# Patient Record
Sex: MALE | Race: OTHER | HISPANIC OR LATINO
[De-identification: names, ages, dates, MRNs, and addresses within clinical notes are randomized per-mention and may not be internally consistent; named-entity substitution may affect disease eponyms.]

---

## 2018-05-31 ENCOUNTER — APPOINTMENT (OUTPATIENT)
Dept: OPHTHALMOLOGY | Facility: CLINIC | Age: 83
End: 2018-05-31
Payer: MEDICARE

## 2018-05-31 PROCEDURE — 92015 DETERMINE REFRACTIVE STATE: CPT

## 2018-05-31 PROCEDURE — 92004 COMPRE OPH EXAM NEW PT 1/>: CPT

## 2018-05-31 PROCEDURE — 92020 GONIOSCOPY: CPT

## 2019-04-24 ENCOUNTER — APPOINTMENT (OUTPATIENT)
Dept: ORTHOPEDIC SURGERY | Facility: CLINIC | Age: 84
End: 2019-04-24

## 2019-10-01 ENCOUNTER — APPOINTMENT (OUTPATIENT)
Dept: OPHTHALMOLOGY | Facility: CLINIC | Age: 84
End: 2019-10-01
Payer: MEDICARE

## 2019-10-01 ENCOUNTER — NON-APPOINTMENT (OUTPATIENT)
Age: 84
End: 2019-10-01

## 2019-10-01 PROCEDURE — 92014 COMPRE OPH EXAM EST PT 1/>: CPT

## 2019-10-01 PROCEDURE — 92132 CPTRZD OPH DX IMG ANT SGM: CPT

## 2020-11-25 ENCOUNTER — TRANSCRIPTION ENCOUNTER (OUTPATIENT)
Age: 85
End: 2020-11-25

## 2020-11-25 ENCOUNTER — INPATIENT (INPATIENT)
Facility: HOSPITAL | Age: 85
LOS: 1 days | Discharge: HOME | DRG: 340 | End: 2020-11-27
Attending: SURGERY | Admitting: SURGERY
Payer: COMMERCIAL

## 2020-11-25 VITALS
SYSTOLIC BLOOD PRESSURE: 151 MMHG | WEIGHT: 130.07 LBS | TEMPERATURE: 98 F | HEIGHT: 65 IN | RESPIRATION RATE: 16 BRPM | DIASTOLIC BLOOD PRESSURE: 81 MMHG | HEART RATE: 82 BPM | OXYGEN SATURATION: 96 %

## 2020-11-25 LAB
ALBUMIN SERPL ELPH-MCNC: 3.8 G/DL — SIGNIFICANT CHANGE UP (ref 3.3–5)
ALP SERPL-CCNC: 61 U/L — SIGNIFICANT CHANGE UP (ref 40–120)
ALT FLD-CCNC: 15 U/L — SIGNIFICANT CHANGE UP (ref 10–45)
ANION GAP SERPL CALC-SCNC: 12 MMOL/L — SIGNIFICANT CHANGE UP (ref 5–17)
APTT BLD: 28.8 SEC — SIGNIFICANT CHANGE UP (ref 27.5–35.5)
AST SERPL-CCNC: 16 U/L — SIGNIFICANT CHANGE UP (ref 10–40)
BASOPHILS # BLD AUTO: 0 K/UL — SIGNIFICANT CHANGE UP (ref 0–0.2)
BASOPHILS NFR BLD AUTO: 0 % — SIGNIFICANT CHANGE UP (ref 0–2)
BILIRUB SERPL-MCNC: 0.7 MG/DL — SIGNIFICANT CHANGE UP (ref 0.2–1.2)
BLD GP AB SCN SERPL QL: NEGATIVE — SIGNIFICANT CHANGE UP
BUN SERPL-MCNC: 18 MG/DL — SIGNIFICANT CHANGE UP (ref 7–23)
CALCIUM SERPL-MCNC: 9.4 MG/DL — SIGNIFICANT CHANGE UP (ref 8.4–10.5)
CHLORIDE SERPL-SCNC: 101 MMOL/L — SIGNIFICANT CHANGE UP (ref 96–108)
CO2 SERPL-SCNC: 23 MMOL/L — SIGNIFICANT CHANGE UP (ref 22–31)
CREAT SERPL-MCNC: 1.18 MG/DL — SIGNIFICANT CHANGE UP (ref 0.5–1.3)
EOSINOPHIL # BLD AUTO: 0 K/UL — SIGNIFICANT CHANGE UP (ref 0–0.5)
EOSINOPHIL NFR BLD AUTO: 0 % — SIGNIFICANT CHANGE UP (ref 0–6)
GIANT PLATELETS BLD QL SMEAR: PRESENT — SIGNIFICANT CHANGE UP
GLUCOSE SERPL-MCNC: 93 MG/DL — SIGNIFICANT CHANGE UP (ref 70–99)
HCT VFR BLD CALC: 40.6 % — SIGNIFICANT CHANGE UP (ref 39–50)
HGB BLD-MCNC: 13.3 G/DL — SIGNIFICANT CHANGE UP (ref 13–17)
INR BLD: 1.14 RATIO — SIGNIFICANT CHANGE UP (ref 0.88–1.16)
LIDOCAIN IGE QN: 37 U/L — SIGNIFICANT CHANGE UP (ref 7–60)
LYMPHOCYTES # BLD AUTO: 0.65 K/UL — LOW (ref 1–3.3)
LYMPHOCYTES # BLD AUTO: 5.2 % — LOW (ref 13–44)
MANUAL SMEAR VERIFICATION: SIGNIFICANT CHANGE UP
MCHC RBC-ENTMCNC: 30.9 PG — SIGNIFICANT CHANGE UP (ref 27–34)
MCHC RBC-ENTMCNC: 32.8 GM/DL — SIGNIFICANT CHANGE UP (ref 32–36)
MCV RBC AUTO: 94.2 FL — SIGNIFICANT CHANGE UP (ref 80–100)
MONOCYTES # BLD AUTO: 1.63 K/UL — HIGH (ref 0–0.9)
MONOCYTES NFR BLD AUTO: 13.1 % — SIGNIFICANT CHANGE UP (ref 2–14)
NEUTROPHILS # BLD AUTO: 9.85 K/UL — HIGH (ref 1.8–7.4)
NEUTROPHILS NFR BLD AUTO: 79.1 % — HIGH (ref 43–77)
PLAT MORPH BLD: NORMAL — SIGNIFICANT CHANGE UP
PLATELET # BLD AUTO: 194 K/UL — SIGNIFICANT CHANGE UP (ref 150–400)
POTASSIUM SERPL-MCNC: 4.6 MMOL/L — SIGNIFICANT CHANGE UP (ref 3.5–5.3)
POTASSIUM SERPL-SCNC: 4.6 MMOL/L — SIGNIFICANT CHANGE UP (ref 3.5–5.3)
PROT SERPL-MCNC: 6.6 G/DL — SIGNIFICANT CHANGE UP (ref 6–8.3)
PROTHROM AB SERPL-ACNC: 13.6 SEC — SIGNIFICANT CHANGE UP (ref 10.6–13.6)
RBC # BLD: 4.31 M/UL — SIGNIFICANT CHANGE UP (ref 4.2–5.8)
RBC # FLD: 13.2 % — SIGNIFICANT CHANGE UP (ref 10.3–14.5)
RBC BLD AUTO: SIGNIFICANT CHANGE UP
RH IG SCN BLD-IMP: POSITIVE — SIGNIFICANT CHANGE UP
SODIUM SERPL-SCNC: 136 MMOL/L — SIGNIFICANT CHANGE UP (ref 135–145)
VARIANT LYMPHS # BLD: 2.6 % — SIGNIFICANT CHANGE UP (ref 0–6)
WBC # BLD: 12.45 K/UL — HIGH (ref 3.8–10.5)
WBC # FLD AUTO: 12.45 K/UL — HIGH (ref 3.8–10.5)

## 2020-11-25 PROCEDURE — 99285 EMERGENCY DEPT VISIT HI MDM: CPT

## 2020-11-25 RX ORDER — SODIUM CHLORIDE 9 MG/ML
1000 INJECTION, SOLUTION INTRAVENOUS
Refills: 0 | Status: DISCONTINUED | OUTPATIENT
Start: 2020-11-25 | End: 2020-11-26

## 2020-11-25 RX ORDER — TAMSULOSIN HYDROCHLORIDE 0.4 MG/1
0.4 CAPSULE ORAL AT BEDTIME
Refills: 0 | Status: DISCONTINUED | OUTPATIENT
Start: 2020-11-25 | End: 2020-11-26

## 2020-11-25 RX ORDER — PIPERACILLIN AND TAZOBACTAM 4; .5 G/20ML; G/20ML
3.38 INJECTION, POWDER, LYOPHILIZED, FOR SOLUTION INTRAVENOUS ONCE
Refills: 0 | Status: COMPLETED | OUTPATIENT
Start: 2020-11-25 | End: 2020-11-25

## 2020-11-25 RX ORDER — ENOXAPARIN SODIUM 100 MG/ML
40 INJECTION SUBCUTANEOUS DAILY
Refills: 0 | Status: DISCONTINUED | OUTPATIENT
Start: 2020-11-25 | End: 2020-11-26

## 2020-11-25 RX ORDER — MEMANTINE HYDROCHLORIDE 10 MG/1
10 TABLET ORAL DAILY
Refills: 0 | Status: DISCONTINUED | OUTPATIENT
Start: 2020-11-25 | End: 2020-11-26

## 2020-11-25 RX ORDER — BUPROPION HYDROCHLORIDE 150 MG/1
300 TABLET, EXTENDED RELEASE ORAL DAILY
Refills: 0 | Status: DISCONTINUED | OUTPATIENT
Start: 2020-11-25 | End: 2020-11-26

## 2020-11-25 RX ADMIN — PIPERACILLIN AND TAZOBACTAM 200 GRAM(S): 4; .5 INJECTION, POWDER, LYOPHILIZED, FOR SOLUTION INTRAVENOUS at 21:48

## 2020-11-25 NOTE — ED ADULT NURSE NOTE - NSIMPLEMENTINTERV_GEN_ALL_ED
Implemented All Fall Risk Interventions:  [place] to call system. Call bell, personal items and telephone within reach. Instruct patient to call for assistance. Room bathroom lighting operational. Non-slip footwear when patient is off stretcher. Physically safe environment: no spills, clutter or unnecessary equipment. Stretcher in lowest position, wheels locked, appropriate side rails in place. Provide visual cue, wrist band, yellow gown, etc. Monitor gait and stability. Monitor for mental status changes and reorient to person, place, and time. Review medications for side effects contributing to fall risk. Reinforce activity limits and safety measures with patient and family.
None

## 2020-11-25 NOTE — H&P ADULT - HISTORY OF PRESENT ILLNESS
84 year old male with PMhx of dementia, HLD, HTN, hx of prostate cancer July 2019, depression presented by his daughter to his inability to discuss his pain states he has RLQ abdominal pain x 1 day. His daughter states she only realized last night that he was guarding and holding his right lower abdomen and groaning. His daughter states they went to their primary care doctor this morning in which a CT scan was done and showed an inflammed appendix and was told to go to the ED. His daughter states she has realized he has lost 5lbs in the last 4 months while keeping the same diet even eating more recently. His daughter states that he has not eaten anything today. His daughter states his last colonscopy was when he was 61 with no significant finding and has not done one since. Patient denies nausea, vomiting, changes in bowel movement, hematochezia, indigestion, dysphagia, fever, chills, any new lumps, skin changes.

## 2020-11-25 NOTE — ED PROVIDER NOTE - CLINICAL SUMMARY MEDICAL DECISION MAKING FREE TEXT BOX
84M presenting with CC of RLQ abd with outpatient dx of appendicitis PE: VSS, RLQ ttp Ddx: Concern for underlying malignancy given age, will assess pre op labs. Plan: Labs, surg consult

## 2020-11-25 NOTE — ED PROVIDER NOTE - RAPID ASSESSMENT
84 year old M with presents to ED c/o abd pain. Per daughter pt had outpt CAT scan that showed appendicitis, showing "large fluid thick walled appendix ". Pt has imaging disc with him. Deferring pain meds at this time.  PMD Dr. Fred Gomez.     Earl Brewer (MD) note: The scribe's (Enedelia Garcia) documentation has been prepared under my direction and personally reviewed by me.  Patient was seen as a tele QDOC patient. The patient will be seen and further worked up in the main emergency department and their care will be completed by the main emergency department team along with a thorough physical exam. Receiving team will follow up on labs, analgesia, any clinical imaging, reassess and disposition as clinically indicated, all decisions regarding the progression of care will be made at their discretion.   Scribe Statement: IJose, attest that this documentation has been prepared under the direction and in the presence of Doctor Earl Brewer (MD) 84 year old M with presents to ED c/o abd pain. Per daughter pt had outpt CAT scan that showed appendicitis, showing "large fluid thick walled appendix ". Pt has imaging disc with him. Deferring pain meds at this time.  PMD Dr. Fred Gomez.       Patient was seen as a tele QDOC patient. The patient will be seen and further worked up in the main emergency department and their care will be completed by the main emergency department team along with a thorough physical exam. Receiving team will follow up on labs, analgesia, any clinical imaging, reassess and disposition as clinically indicated, all decisions regarding the progression of care will be made at their discretion.   Scribe Statement: I, Jose Mcdaniels, attest that this documentation has been prepared under the direction and in the presence of Doctor Earl Brewer (MD)    Earl Brewer (MD) note: The scribe's (Enedelia Garcia) documentation has been prepared under my direction and personally reviewed by me.

## 2020-11-25 NOTE — ED ADULT NURSE NOTE - OBJECTIVE STATEMENT
Patient is a 84 year old male complaining of abdominal pain. Patient has history of dementia, htn. Patient is A&O x 2, pt at baseline as per daughter. as per Pt daughter, pt had abdominal pain x 2 days and went to his pcp and had ct scan that showed appendicitis. pt states pain has improved,  Denies complaints of chest pain, sob, fevers, chills, n/v/d, headache, syncope, burning urination, blood in urine, blood in stool. Skin is warm and dry. Color is consistent with ethnicity. Safety and comfort maintained. Will continue to monitor. Patient is a 84 year old male complaining of abdominal pain. Patient has history of dementia, htn. Patient is A&O x 2, pt at baseline as per daughter. as per Pt daughter, pt had abdominal pain x 2 days and went to his pcp and had ct scan that showed appendicitis. pt states pain has improved. Denies complaints of chest pain, sob, fevers, chills, n/v/d, headache, syncope, burning urination, blood in urine, blood in stool. Skin is warm and dry. Color is consistent with ethnicity. Safety and comfort maintained. daughter at bedside. Will continue to monitor.

## 2020-11-25 NOTE — H&P ADULT - NSHPPHYSICALEXAM_GEN_ALL_CORE
Physical Examination:  GEN: NAD, resting quietly  NEURO: AAOx3, CN II-XII grossly intact, no focal deficits  PULM: symmetric chest rise bilaterally, no increased WOB  ABD: soft, RLQ tenderness with guarding and no rebound tenderness   EXTR: no cyanosis or edema, moving all extremities

## 2020-11-25 NOTE — H&P ADULT - NSICDXPASTMEDICALHX_GEN_ALL_CORE_FT
PAST MEDICAL HISTORY:  Dementia     Depression     HLD (hyperlipidemia)     HTN (hypertension)     Personal history of prostate cancer

## 2020-11-25 NOTE — ED PROVIDER NOTE - ATTENDING CONTRIBUTION TO CARE
Attending MD Aj: I personally have seen and examined this patient.  Resident note reviewed and agree on plan of care and except where noted.  See below for details.     Seen in [place] [facility], accompanied by daughter, interviewed in Greek    [facility] Radiology 11/25/20 date of service "Impression: Enlarged, fluid filled and thick walled appendix, infiltration of the para appendiceal fat, thickening of the right lower quadrant peritoneal lining, mucosal thickening of the cecal wall at the appendiceal insertion.  Findings consistent with acute appendicitis.  Mottled air noted at the appendiceal base, which may be suggestive of appendiceal rupture."    84M with PMH/PSH including dementia (AAOx2 knows name and place, not time), HTN, HLD, prostate cancer, depression presents to the ED with outpatient CT showing appendicitis, ?rupture.  Patient denies all physical complaints.  Daughter reports that noted patient was holding lower abdomen and groaning.  Reports took patient to PMD who ordered CT scan, findings as above.  Daughter reports decreased po intake with associated weight loss over the last few months.  Denies recent colonoscopy, reports had a distant one.  Denies noting blood in stools, increased work of breathing, cough, reports of chest pain, vomiting, rash.  Limited ROS from daughter, unable to obtain given patient's dementia.    Exam:   General: NAD, AAOx2  HENT: head NCAT, airway patent with moist mucous membranes  Eyes: PERRL  Lungs: lungs CTAB with good inspiratory effort, no wheezing, no rhonchi, no rales  Cardiac: +S1S2, no m/r/g  GI: abdomen soft with +BS, +McBurney's point tenderness, no rebound, +voluntary guarding, ND  : no CVAT  MSK: FROM at neck, no tenderness to midline palpation, no stepoffs along length of spine, no calf tenderness, swelling, erythema or warmth  Neuro: moving all extremities with 5/5 strength bilateral upper and lower extremities, sensory grossly intact, no gross neuro deficits  Skin: no rash    A/P: 84M with outpatient perforated appendicitis, will send preop labs, upload CT, consult sx, IV abx, keep npo, likely admit

## 2020-11-25 NOTE — ED PROVIDER NOTE - PHYSICAL EXAMINATION
Const: Well-nourished, Well-developed, appearing stated age.  Eyes: no conjunctival injection, and symmetrical lids.  HEENT: Head NCAT, no lesions. Atraumatic external nose and ears. Moist MM.  Neck: Symmetric, trachea midline.   CVS: +S1/S2, Peripheral pulses 2+ and equal in all extremities.  RESP: Unlabored respiratory effort. Clear to auscultation bilaterally.  GI: +RLQ tenderness to palpation w/out guarding, Nondistended, No CVA tenderness b/l.   MSK: Normocephalic/Atraumatic, Lower Extremities w/o calf tenderness or edema b/l.   Skin: Warm, dry and intact.   Neuro: CNs II-XII grossly intact. Motor & Sensation grossly intact.  Psych: Awake, Alert, & Oriented (AAO) x2, to self and location. Appropriate mood and affect.

## 2020-11-25 NOTE — H&P ADULT - NSHPLABSRESULTS_GEN_ALL_CORE
----------  LABORATORY DATA:  ----------                        13.3   12.45 )-----------( 194      ( 25 Nov 2020 19:54 )             40.6               11-25    136  |  101  |  18  ----------------------------<  93  4.6   |  23  |  1.18    Ca    9.4      25 Nov 2020 19:54    TPro  6.6  /  Alb  3.8  /  TBili  0.7  /  DBili  x   /  AST  16  /  ALT  15  /  AlkPhos  61  11-25    LIVER FUNCTIONS - ( 25 Nov 2020 19:54 )  Alb: 3.8 g/dL / Pro: 6.6 g/dL / ALK PHOS: 61 U/L / ALT: 15 U/L / AST: 16 U/L / GGT: x           PT/INR - ( 25 Nov 2020 19:54 )   PT: 13.6 sec;   INR: 1.14 ratio         PTT - ( 25 Nov 2020 19:54 )  PTT:28.8 sec              ----------  RADIOGRAPHIC DATA:  --------- ----------  LABORATORY DATA:  ----------                        13.3   12.45 )-----------( 194      ( 25 Nov 2020 19:54 )             40.6               11-25    136  |  101  |  18  ----------------------------<  93  4.6   |  23  |  1.18    Ca    9.4      25 Nov 2020 19:54    TPro  6.6  /  Alb  3.8  /  TBili  0.7  /  DBili  x   /  AST  16  /  ALT  15  /  AlkPhos  61  11-25    LIVER FUNCTIONS - ( 25 Nov 2020 19:54 )  Alb: 3.8 g/dL / Pro: 6.6 g/dL / ALK PHOS: 61 U/L / ALT: 15 U/L / AST: 16 U/L / GGT: x           PT/INR - ( 25 Nov 2020 19:54 )   PT: 13.6 sec;   INR: 1.14 ratio         PTT - ( 25 Nov 2020 19:54 )  PTT:28.8 sec              ----------  RADIOGRAPHIC DATA:  ---------  OSH CT  thickened and dilated appendix with significant para-appendiceal inflammation, cecal thickening, and infiltration into the pertioneum with thickening in the RLQ

## 2020-11-25 NOTE — ED PROVIDER NOTE - OBJECTIVE STATEMENT
84M w pmhx of HTN, HLD, BPH presenting with CC of abd pain w associated nausea and vomiting x 1 day. Most of history obtained from daughter who is at bedside as patient has hx of dementia. +diarrhea, no f/c/vomiting, chest pain. No known history of abd surgeries, no AC. Patient went to PCP office and was sent for emergent CT, found to have appendicitis, sent to ER.

## 2020-11-25 NOTE — H&P ADULT - NSHPREVIEWOFSYSTEMS_GEN_ALL_CORE
Patient denies nausea, vomiting, bowel movement changes, changes in urination, fever, chills, dysphagia.

## 2020-11-25 NOTE — H&P ADULT - ASSESSMENT
Mr. Mckenna is an 84 year old male with PMhx of dementia, HLD, HTN, hx of prostate cancer who presents from PCP with appendicitis. The patient will be added on for laparoscopic appendectomy pending evaluation by Dr. Dickerson in AM. He may need additional workup due to patient age and possibility for malignancy.    Plan:  - admit to Dr. Dickerson  - NPO/IVF  - may need 1:1 for confusion/dementia  - continue IV abx  - added on and consented for procedure  - lovenox for dvt ppx  - continue home medications except statin/losartan    Discussed with Dr. Dickerson  Red Surgery  [id number]

## 2020-11-26 ENCOUNTER — RESULT REVIEW (OUTPATIENT)
Age: 85
End: 2020-11-26

## 2020-11-26 DIAGNOSIS — K37 UNSPECIFIED APPENDICITIS: ICD-10-CM

## 2020-11-26 LAB
ANION GAP SERPL CALC-SCNC: 13 MMOL/L — SIGNIFICANT CHANGE UP (ref 5–17)
BUN SERPL-MCNC: 17 MG/DL — SIGNIFICANT CHANGE UP (ref 7–23)
CALCIUM SERPL-MCNC: 9.1 MG/DL — SIGNIFICANT CHANGE UP (ref 8.4–10.5)
CHLORIDE SERPL-SCNC: 100 MMOL/L — SIGNIFICANT CHANGE UP (ref 96–108)
CO2 SERPL-SCNC: 24 MMOL/L — SIGNIFICANT CHANGE UP (ref 22–31)
CREAT SERPL-MCNC: 1.18 MG/DL — SIGNIFICANT CHANGE UP (ref 0.5–1.3)
GLUCOSE SERPL-MCNC: 98 MG/DL — SIGNIFICANT CHANGE UP (ref 70–99)
HCT VFR BLD CALC: 36.1 % — LOW (ref 39–50)
HGB BLD-MCNC: 12.4 G/DL — LOW (ref 13–17)
MAGNESIUM SERPL-MCNC: 2 MG/DL — SIGNIFICANT CHANGE UP (ref 1.6–2.6)
MCHC RBC-ENTMCNC: 31.8 PG — SIGNIFICANT CHANGE UP (ref 27–34)
MCHC RBC-ENTMCNC: 34.3 GM/DL — SIGNIFICANT CHANGE UP (ref 32–36)
MCV RBC AUTO: 92.6 FL — SIGNIFICANT CHANGE UP (ref 80–100)
NRBC # BLD: 0 /100 WBCS — SIGNIFICANT CHANGE UP (ref 0–0)
PHOSPHATE SERPL-MCNC: 3.2 MG/DL — SIGNIFICANT CHANGE UP (ref 2.5–4.5)
PLATELET # BLD AUTO: 188 K/UL — SIGNIFICANT CHANGE UP (ref 150–400)
POTASSIUM SERPL-MCNC: 4.2 MMOL/L — SIGNIFICANT CHANGE UP (ref 3.5–5.3)
POTASSIUM SERPL-SCNC: 4.2 MMOL/L — SIGNIFICANT CHANGE UP (ref 3.5–5.3)
RBC # BLD: 3.9 M/UL — LOW (ref 4.2–5.8)
RBC # FLD: 13.2 % — SIGNIFICANT CHANGE UP (ref 10.3–14.5)
SARS-COV-2 RNA SPEC QL NAA+PROBE: SIGNIFICANT CHANGE UP
SODIUM SERPL-SCNC: 137 MMOL/L — SIGNIFICANT CHANGE UP (ref 135–145)
WBC # BLD: 11.4 K/UL — HIGH (ref 3.8–10.5)
WBC # FLD AUTO: 11.4 K/UL — HIGH (ref 3.8–10.5)

## 2020-11-26 PROCEDURE — 88304 TISSUE EXAM BY PATHOLOGIST: CPT | Mod: 26

## 2020-11-26 RX ORDER — MEMANTINE HYDROCHLORIDE 10 MG/1
1 TABLET ORAL
Qty: 0 | Refills: 0 | DISCHARGE

## 2020-11-26 RX ORDER — LOSARTAN POTASSIUM 100 MG/1
1 TABLET, FILM COATED ORAL
Qty: 0 | Refills: 0 | DISCHARGE

## 2020-11-26 RX ORDER — PIPERACILLIN AND TAZOBACTAM 4; .5 G/20ML; G/20ML
3.38 INJECTION, POWDER, LYOPHILIZED, FOR SOLUTION INTRAVENOUS EVERY 8 HOURS
Refills: 0 | Status: DISCONTINUED | OUTPATIENT
Start: 2020-11-26 | End: 2020-11-26

## 2020-11-26 RX ORDER — PIPERACILLIN AND TAZOBACTAM 4; .5 G/20ML; G/20ML
3.38 INJECTION, POWDER, LYOPHILIZED, FOR SOLUTION INTRAVENOUS EVERY 8 HOURS
Refills: 0 | Status: DISCONTINUED | OUTPATIENT
Start: 2020-11-26 | End: 2020-11-27

## 2020-11-26 RX ORDER — HYDROMORPHONE HYDROCHLORIDE 2 MG/ML
0.5 INJECTION INTRAMUSCULAR; INTRAVENOUS; SUBCUTANEOUS
Refills: 0 | Status: DISCONTINUED | OUTPATIENT
Start: 2020-11-26 | End: 2020-11-26

## 2020-11-26 RX ORDER — ENOXAPARIN SODIUM 100 MG/ML
40 INJECTION SUBCUTANEOUS DAILY
Refills: 0 | Status: DISCONTINUED | OUTPATIENT
Start: 2020-11-26 | End: 2020-11-27

## 2020-11-26 RX ORDER — LOSARTAN POTASSIUM 100 MG/1
50 TABLET, FILM COATED ORAL DAILY
Refills: 0 | Status: DISCONTINUED | OUTPATIENT
Start: 2020-11-26 | End: 2020-11-27

## 2020-11-26 RX ORDER — ONDANSETRON 8 MG/1
4 TABLET, FILM COATED ORAL ONCE
Refills: 0 | Status: DISCONTINUED | OUTPATIENT
Start: 2020-11-26 | End: 2020-11-26

## 2020-11-26 RX ORDER — SIMVASTATIN 20 MG/1
1 TABLET, FILM COATED ORAL
Qty: 0 | Refills: 0 | DISCHARGE

## 2020-11-26 RX ORDER — TAMSULOSIN HYDROCHLORIDE 0.4 MG/1
1 CAPSULE ORAL
Qty: 0 | Refills: 0 | DISCHARGE

## 2020-11-26 RX ORDER — BUPROPION HYDROCHLORIDE 150 MG/1
1 TABLET, EXTENDED RELEASE ORAL
Qty: 0 | Refills: 0 | DISCHARGE

## 2020-11-26 RX ORDER — SIMVASTATIN 20 MG/1
40 TABLET, FILM COATED ORAL AT BEDTIME
Refills: 0 | Status: DISCONTINUED | OUTPATIENT
Start: 2020-11-26 | End: 2020-11-27

## 2020-11-26 RX ORDER — ACETAMINOPHEN 500 MG
650 TABLET ORAL EVERY 6 HOURS
Refills: 0 | Status: DISCONTINUED | OUTPATIENT
Start: 2020-11-26 | End: 2020-11-27

## 2020-11-26 RX ORDER — BUPROPION HYDROCHLORIDE 150 MG/1
300 TABLET, EXTENDED RELEASE ORAL DAILY
Refills: 0 | Status: DISCONTINUED | OUTPATIENT
Start: 2020-11-26 | End: 2020-11-27

## 2020-11-26 RX ORDER — HYDROMORPHONE HYDROCHLORIDE 2 MG/ML
0.25 INJECTION INTRAMUSCULAR; INTRAVENOUS; SUBCUTANEOUS
Refills: 0 | Status: DISCONTINUED | OUTPATIENT
Start: 2020-11-26 | End: 2020-11-26

## 2020-11-26 RX ORDER — IBUPROFEN 200 MG
400 TABLET ORAL EVERY 6 HOURS
Refills: 0 | Status: DISCONTINUED | OUTPATIENT
Start: 2020-11-26 | End: 2020-11-27

## 2020-11-26 RX ORDER — OXYCODONE HYDROCHLORIDE 5 MG/1
5 TABLET ORAL EVERY 6 HOURS
Refills: 0 | Status: DISCONTINUED | OUTPATIENT
Start: 2020-11-26 | End: 2020-11-27

## 2020-11-26 RX ORDER — ACETAMINOPHEN 500 MG
1000 TABLET ORAL ONCE
Refills: 0 | Status: DISCONTINUED | OUTPATIENT
Start: 2020-11-26 | End: 2020-11-26

## 2020-11-26 RX ORDER — TAMSULOSIN HYDROCHLORIDE 0.4 MG/1
0.4 CAPSULE ORAL AT BEDTIME
Refills: 0 | Status: DISCONTINUED | OUTPATIENT
Start: 2020-11-26 | End: 2020-11-27

## 2020-11-26 RX ADMIN — MEMANTINE HYDROCHLORIDE 10 MILLIGRAM(S): 10 TABLET ORAL at 00:51

## 2020-11-26 RX ADMIN — Medication 400 MILLIGRAM(S): at 21:58

## 2020-11-26 RX ADMIN — Medication 400 MILLIGRAM(S): at 21:28

## 2020-11-26 RX ADMIN — SODIUM CHLORIDE 100 MILLILITER(S): 9 INJECTION, SOLUTION INTRAVENOUS at 01:54

## 2020-11-26 RX ADMIN — ENOXAPARIN SODIUM 40 MILLIGRAM(S): 100 INJECTION SUBCUTANEOUS at 12:07

## 2020-11-26 RX ADMIN — TAMSULOSIN HYDROCHLORIDE 0.4 MILLIGRAM(S): 0.4 CAPSULE ORAL at 00:52

## 2020-11-26 RX ADMIN — PIPERACILLIN AND TAZOBACTAM 25 GRAM(S): 4; .5 INJECTION, POWDER, LYOPHILIZED, FOR SOLUTION INTRAVENOUS at 05:42

## 2020-11-26 RX ADMIN — PIPERACILLIN AND TAZOBACTAM 25 GRAM(S): 4; .5 INJECTION, POWDER, LYOPHILIZED, FOR SOLUTION INTRAVENOUS at 21:28

## 2020-11-26 RX ADMIN — MEMANTINE HYDROCHLORIDE 10 MILLIGRAM(S): 10 TABLET ORAL at 12:07

## 2020-11-26 RX ADMIN — TAMSULOSIN HYDROCHLORIDE 0.4 MILLIGRAM(S): 0.4 CAPSULE ORAL at 21:29

## 2020-11-26 RX ADMIN — PIPERACILLIN AND TAZOBACTAM 25 GRAM(S): 4; .5 INJECTION, POWDER, LYOPHILIZED, FOR SOLUTION INTRAVENOUS at 13:15

## 2020-11-26 RX ADMIN — BUPROPION HYDROCHLORIDE 300 MILLIGRAM(S): 150 TABLET, EXTENDED RELEASE ORAL at 12:07

## 2020-11-26 RX ADMIN — SIMVASTATIN 40 MILLIGRAM(S): 20 TABLET, FILM COATED ORAL at 21:28

## 2020-11-26 NOTE — PROGRESS NOTE ADULT - ASSESSMENT
Mr. Mckenna is an 84 year old male with PMhx of dementia, HLD, HTN, hx of prostate cancer who presents from PCP with appendicitis. The patient will be added on for laparoscopic appendectomy pending evaluation by Dr. Dickerson in AM. He may need additional workup due to patient age and possibility for malignancy.    Plan:  - NPO/IVF  - 1:1 for confusion/dementia; s/p self IV line removal  - continue IV abx Zosyn   - added on and consented for procedure  - lovenox for dvt ppx  - continue home medications except statin/losartan    Red Surgery  [id number] Mr. Mckenna is an 84 year old male with PMhx of dementia, HLD, HTN, hx of prostate cancer who presents from PCP with appendicitis. The patient will be added on for laparoscopic appendectomy pending evaluation by Dr. Dickerson in AM. He may need additional workup due to patient age and possibility for malignancy.    Plan:  - NPO/IVF  - 1:1 dc'd this morning   - continue IV abx Zosyn   - added on and consented for procedure  - lovenox for dvt ppx  - continue home medications except statin/losartan    Red Surgery  [id number]

## 2020-11-26 NOTE — CHART NOTE - NSCHARTNOTEFT_GEN_A_CORE
Post Operative Note  Patient: CUONG PAIGE 84y (1935) Male   MRN: [id number]  Location: [address] D1  Visit: 11-26-20 Inpatient  Date: 11-26-20 @ 19:47    Procedure: S/P lap appendectomy    Subjective: Patient is  Denies N/V, fevers, chills.       Objective:  Vitals: T(F): 98.1 (11-26-20 @ 18:59), Max: 99.2 (11-25-20 @ 21:13)  HR: 95 (11-26-20 @ 18:59)  BP: 108/67 (11-26-20 @ 18:59) (93/55 - 127/73)  RR: 17 (11-26-20 @ 18:59)  SpO2: 94% (11-26-20 @ 18:59)  Vent Settings:     In:   11-25-20 @ 07:01  -  11-26-20 @ 07:00  --------------------------------------------------------  IN: 800 mL    11-26-20 @ 07:01  -  11-26-20 @ 19:47  --------------------------------------------------------  IN: 0 mL      IV Fluids:     Out:   11-25-20 @ 07:01  -  11-26-20 @ 07:00  --------------------------------------------------------  OUT: 300 mL    11-26-20 @ 07:01  -  11-26-20 @ 19:47  --------------------------------------------------------  OUT: 300 mL      EBL:     Voided Urine:   11-25-20 @ 07:01  -  11-26-20 @ 07:00  --------------------------------------------------------  OUT: 300 mL    11-26-20 @ 07:01  -  11-26-20 @ 19:47  --------------------------------------------------------  OUT: 300 mL      Eid Catheter: no           Physical Examination:  General: NAD, resting comfortably in bed  HEENT: Normocephalic atraumatic  Respiratory: Nonlabored respirations, normal CW expansion.  Cardio: S1S2, regular rate and rhythm.  Abdomen: softly distended, appropriately tender, surgical incisions are c/d/i.   Vascular: extremities are warm and well perfused.     Imaging:  No post-op imaging studies    Assessment:  84yMale patient S/P lap appy    Plan:  - IV Abx: zosyn  - Pain control PRN  - Diet: Reg  - Activity: as tolerated  - DVT ppx: lovenox    Red surgery 9002    Date/Time: 11-26-20 @ 19:47 Post Operative Note  Patient: CUONG PAIGE 84y (1935) Male   MRN: [id number]  Location: [facility] 2MON 211 D1  Visit: 11-26-20 Inpatient  Date: 11-26-20 @ 19:47    Procedure: S/P lap appendectomy    Subjective: Patient reports pain well controlled. Has not passed gas or had BM.   Denies N/V, fevers, chills, SOB.      Objective:  Vitals: T(F): 98.1 (11-26-20 @ 18:59), Max: 99.2 (11-25-20 @ 21:13)  HR: 95 (11-26-20 @ 18:59)  BP: 108/67 (11-26-20 @ 18:59) (93/55 - 127/73)  RR: 17 (11-26-20 @ 18:59)  SpO2: 94% (11-26-20 @ 18:59)  Vent Settings:     In:   11-25-20 @ 07:01  -  11-26-20 @ 07:00  --------------------------------------------------------  IN: 800 mL    11-26-20 @ 07:01  -  11-26-20 @ 19:47  --------------------------------------------------------  IN: 0 mL      IV Fluids:     Out:   11-25-20 @ 07:01  -  11-26-20 @ 07:00  --------------------------------------------------------  OUT: 300 mL    11-26-20 @ 07:01  -  11-26-20 @ 19:47  --------------------------------------------------------  OUT: 300 mL      EBL:     Voided Urine:   11-25-20 @ 07:01  -  11-26-20 @ 07:00  --------------------------------------------------------  OUT: 300 mL    11-26-20 @ 07:01  -  11-26-20 @ 19:47  --------------------------------------------------------  OUT: 300 mL      Eid Catheter: no           Physical Examination:  General: NAD, resting comfortably in bed  HEENT: Normocephalic atraumatic  Respiratory: Nonlabored respirations, normal CW expansion.  Cardio: S1S2, regular rate and rhythm.  Abdomen: softly distended, appropriately tender, surgical incisions are c/d/i.   Vascular: extremities are warm and well perfused.     Imaging:  No post-op imaging studies    Assessment:  84yMale patient S/P lap appendectomy, recovering well.    Plan:  - IV Abx: zosyn  - Pain control PRN  - Diet: Reg  - Activity: as tolerated  - DVT ppx: lovenox    Red surgery 9002    Date/Time: 11-26-20 @ 19:47 Post Operative Note  Patient: CUONG PAIGE 84y (1935) Male   MRN: [id number]  Location: [facility] 2MON 211 D1  Visit: 11-26-20 Inpatient  Date: 11-26-20 @ 19:47    Procedure: S/P lap appendectomy    Subjective: Patient reports pain well controlled. Has not passed gas or had BM.   Denies N/V, fevers, chills, SOB.      Objective:  Vitals: T(F): 98.1 (11-26-20 @ 18:59), Max: 99.2 (11-25-20 @ 21:13)  HR: 95 (11-26-20 @ 18:59)  BP: 108/67 (11-26-20 @ 18:59) (93/55 - 127/73)  RR: 17 (11-26-20 @ 18:59)  SpO2: 94% (11-26-20 @ 18:59)  Vent Settings:     In:   11-25-20 @ 07:01  -  11-26-20 @ 07:00  --------------------------------------------------------  IN: 800 mL    11-26-20 @ 07:01  -  11-26-20 @ 19:47  --------------------------------------------------------  IN: 0 mL      IV Fluids:     Out:   11-25-20 @ 07:01  -  11-26-20 @ 07:00  --------------------------------------------------------  OUT: 300 mL    11-26-20 @ 07:01  -  11-26-20 @ 19:47  --------------------------------------------------------  OUT: 300 mL      EBL:     Voided Urine:   11-25-20 @ 07:01  -  11-26-20 @ 07:00  --------------------------------------------------------  OUT: 300 mL    11-26-20 @ 07:01  -  11-26-20 @ 19:47  --------------------------------------------------------  OUT: 300 mL      Eid Catheter: no           Physical Examination:  General: NAD, resting comfortably in bed  HEENT: Normocephalic atraumatic  Respiratory: Nonlabored respirations, normal CW expansion.  Cardio: S1S2, regular rate and rhythm.  Abdomen: softly distended, appropriately tender, surgical incisions are c/d/i.   Vascular: extremities are warm and well perfused.     Imaging:  No post-op imaging studies    Assessment:  84yMale patient several hours S/P lap appendectomy, recovering well.    Plan:  - IV Abx: zosyn  - Pain control PRN  - Diet: Reg  - Activity: as tolerated  - DVT ppx: lovenox    Red surgery 9002    Date/Time: 11-26-20 @ 19:47

## 2020-11-26 NOTE — PROGRESS NOTE ADULT - SUBJECTIVE AND OBJECTIVE BOX
GENERAL SURGERY PROGRESS NOTE   ___________________________________________________________________    CUONG PAIGE | [id number] | 84y Male | [facility] 2MON 211 W1 | LOS     Attending: Bert Dickerson    ___________________________________________________________________      SUBJECTIVE:       Diet, NPO:   Except Medications (11-26-20 @ 00:47) [Active]      PMH:   Personal history of prostate cancer    Depression    Dementia    HLD (hyperlipidemia)    HTN (hypertension)          OBJECTIVE:    Allegies:  NKDA    MEDICATIONS  (STANDING):  buPROPion XL . 300 milliGRAM(s) Oral daily  enoxaparin Injectable 40 milliGRAM(s) SubCutaneous daily  lactated ringers. 1000 milliLiter(s) (100 mL/Hr) IV Continuous <Continuous>  memantine 10 milliGRAM(s) Oral daily  piperacillin/tazobactam IVPB.. 3.375 Gram(s) IV Intermittent every 8 hours  tamsulosin 0.4 milliGRAM(s) Oral at bedtime    MEDICATIONS  (PRN):  acetaminophen  IVPB .. 1000 milliGRAM(s) IV Intermittent once PRN Mild Pain (1 - 3)      Vitals:  Height (cm): 165.1  Weight (kg): 59  BMI (kg/m2): 21.6  T(C): 37 (11-26-20 @ 00:54), Max: 37.3 (11-25-20 @ 21:13)  HR: 82 (11-26-20 @ 00:54) (80 - 82)  BP: 127/73 (11-26-20 @ 00:54) (118/69 - 151/81)  RR: 18 (11-26-20 @ 00:54) (16 - 18)  SpO2: 98% (11-26-20 @ 00:54) (96% - 100%)        Intake&Output:  Totals:    Outputs:    Urine Output:      Laboratory:                        13.3   12.45 )-----------( 194      ( 25 Nov 2020 19:54 )             40.6               11-25    136  |  101  |  18  ----------------------------<  93  4.6   |  23  |  1.18    Ca    9.4      25 Nov 2020 19:54    TPro  6.6  /  Alb  3.8  /  TBili  0.7  /  DBili  x   /  AST  16  /  ALT  15  /  AlkPhos  61  11-25    LIVER FUNCTIONS - ( 25 Nov 2020 19:54 )  Alb: 3.8 g/dL / Pro: 6.6 g/dL / ALK PHOS: 61 U/L / ALT: 15 U/L / AST: 16 U/L / GGT: x           PT/INR - ( 25 Nov 2020 19:54 )   PT: 13.6 sec;   INR: 1.14 ratio         PTT - ( 25 Nov 2020 19:54 )  PTT:28.8 sec    COVID-19 PCR: NotDetec (25 Nov 2020 21:10)  ,   ,   CAPILLARY BLOOD GLUCOSE            Recent Imaging:      Reviewed laboratory and imaging   GENERAL SURGERY PROGRESS NOTE   ___________________________________________________________________    CUONG PAIGE | [id number] | 84y Male | [facility] 2[facility] 211 W1 | LOS     Attending: Bert Dickerson    ___________________________________________________________________      SUBJECTIVE: 1:1 ordered yesterday because pt pulled IV out. Asked by floor to DC 1:1 because pt is calm and they wrapped up IV. Pt seen and examined at bedside. Patient comfortable. No nausea, vomiting, diarrhea. Pain is controlled. NPO for OR      Diet, NPO:   Except Medications (11-26-20 @ 00:47) [Active]      PMH:   Personal history of prostate cancer    Depression    Dementia    HLD (hyperlipidemia)    HTN (hypertension)          OBJECTIVE:    Allegies:  NKDA    MEDICATIONS  (STANDING):  buPROPion XL . 300 milliGRAM(s) Oral daily  enoxaparin Injectable 40 milliGRAM(s) SubCutaneous daily  lactated ringers. 1000 milliLiter(s) (100 mL/Hr) IV Continuous <Continuous>  memantine 10 milliGRAM(s) Oral daily  piperacillin/tazobactam IVPB.. 3.375 Gram(s) IV Intermittent every 8 hours  tamsulosin 0.4 milliGRAM(s) Oral at bedtime    MEDICATIONS  (PRN):  acetaminophen  IVPB .. 1000 milliGRAM(s) IV Intermittent once PRN Mild Pain (1 - 3)      Vitals:  Height (cm): 165.1  Weight (kg): 59  BMI (kg/m2): 21.6  T(C): 37 (11-26-20 @ 00:54), Max: 37.3 (11-25-20 @ 21:13)  HR: 82 (11-26-20 @ 00:54) (80 - 82)  BP: 127/73 (11-26-20 @ 00:54) (118/69 - 151/81)  RR: 18 (11-26-20 @ 00:54) (16 - 18)  SpO2: 98% (11-26-20 @ 00:54) (96% - 100%)        Intake&Output:  Totals:    Outputs:    Urine Output:      Laboratory:                        13.3   12.45 )-----------( 194      ( 25 Nov 2020 19:54 )             40.6               11-25    136  |  101  |  18  ----------------------------<  93  4.6   |  23  |  1.18    Ca    9.4      25 Nov 2020 19:54    TPro  6.6  /  Alb  3.8  /  TBili  0.7  /  DBili  x   /  AST  16  /  ALT  15  /  AlkPhos  61  11-25    LIVER FUNCTIONS - ( 25 Nov 2020 19:54 )  Alb: 3.8 g/dL / Pro: 6.6 g/dL / ALK PHOS: 61 U/L / ALT: 15 U/L / AST: 16 U/L / GGT: x           PT/INR - ( 25 Nov 2020 19:54 )   PT: 13.6 sec;   INR: 1.14 ratio         PTT - ( 25 Nov 2020 19:54 )  PTT:28.8 sec    COVID-19 PCR: NotDetec (25 Nov 2020 21:10)  ,   ,   CAPILLARY BLOOD GLUCOSE            Recent Imaging:      Reviewed laboratory and imaging

## 2020-11-26 NOTE — BRIEF OPERATIVE NOTE - OPERATION/FINDINGS
Laparoscopic appendectomy: perforated appendix with walled off small abscess (drained). hemostasis achieved.

## 2020-11-27 ENCOUNTER — TRANSCRIPTION ENCOUNTER (OUTPATIENT)
Age: 85
End: 2020-11-27

## 2020-11-27 VITALS
TEMPERATURE: 98 F | HEART RATE: 85 BPM | DIASTOLIC BLOOD PRESSURE: 77 MMHG | SYSTOLIC BLOOD PRESSURE: 125 MMHG | OXYGEN SATURATION: 97 % | RESPIRATION RATE: 18 BRPM

## 2020-11-27 LAB
ANION GAP SERPL CALC-SCNC: 11 MMOL/L — SIGNIFICANT CHANGE UP (ref 5–17)
ANION GAP SERPL CALC-SCNC: 14 MMOL/L — SIGNIFICANT CHANGE UP (ref 5–17)
BUN SERPL-MCNC: 19 MG/DL — SIGNIFICANT CHANGE UP (ref 7–23)
BUN SERPL-MCNC: 19 MG/DL — SIGNIFICANT CHANGE UP (ref 7–23)
CALCIUM SERPL-MCNC: 8.7 MG/DL — SIGNIFICANT CHANGE UP (ref 8.4–10.5)
CALCIUM SERPL-MCNC: 9 MG/DL — SIGNIFICANT CHANGE UP (ref 8.4–10.5)
CHLORIDE SERPL-SCNC: 100 MMOL/L — SIGNIFICANT CHANGE UP (ref 96–108)
CHLORIDE SERPL-SCNC: 103 MMOL/L — SIGNIFICANT CHANGE UP (ref 96–108)
CO2 SERPL-SCNC: 22 MMOL/L — SIGNIFICANT CHANGE UP (ref 22–31)
CO2 SERPL-SCNC: 25 MMOL/L — SIGNIFICANT CHANGE UP (ref 22–31)
CREAT SERPL-MCNC: 1.24 MG/DL — SIGNIFICANT CHANGE UP (ref 0.5–1.3)
CREAT SERPL-MCNC: 1.31 MG/DL — HIGH (ref 0.5–1.3)
GLUCOSE SERPL-MCNC: 114 MG/DL — HIGH (ref 70–99)
GLUCOSE SERPL-MCNC: 99 MG/DL — SIGNIFICANT CHANGE UP (ref 70–99)
HCT VFR BLD CALC: 33.7 % — LOW (ref 39–50)
HGB BLD-MCNC: 11.5 G/DL — LOW (ref 13–17)
MAGNESIUM SERPL-MCNC: 2.2 MG/DL — SIGNIFICANT CHANGE UP (ref 1.6–2.6)
MCHC RBC-ENTMCNC: 31.4 PG — SIGNIFICANT CHANGE UP (ref 27–34)
MCHC RBC-ENTMCNC: 34.1 GM/DL — SIGNIFICANT CHANGE UP (ref 32–36)
MCV RBC AUTO: 92.1 FL — SIGNIFICANT CHANGE UP (ref 80–100)
NRBC # BLD: 0 /100 WBCS — SIGNIFICANT CHANGE UP (ref 0–0)
PHOSPHATE SERPL-MCNC: 4.2 MG/DL — SIGNIFICANT CHANGE UP (ref 2.5–4.5)
PLATELET # BLD AUTO: 188 K/UL — SIGNIFICANT CHANGE UP (ref 150–400)
POTASSIUM SERPL-MCNC: 3.8 MMOL/L — SIGNIFICANT CHANGE UP (ref 3.5–5.3)
POTASSIUM SERPL-MCNC: 4.3 MMOL/L — SIGNIFICANT CHANGE UP (ref 3.5–5.3)
POTASSIUM SERPL-SCNC: 3.8 MMOL/L — SIGNIFICANT CHANGE UP (ref 3.5–5.3)
POTASSIUM SERPL-SCNC: 4.3 MMOL/L — SIGNIFICANT CHANGE UP (ref 3.5–5.3)
RBC # BLD: 3.66 M/UL — LOW (ref 4.2–5.8)
RBC # FLD: 13.2 % — SIGNIFICANT CHANGE UP (ref 10.3–14.5)
SARS-COV-2 IGG SERPL QL IA: NEGATIVE — SIGNIFICANT CHANGE UP
SARS-COV-2 IGM SERPL IA-ACNC: <0.1 INDEX — SIGNIFICANT CHANGE UP
SODIUM SERPL-SCNC: 136 MMOL/L — SIGNIFICANT CHANGE UP (ref 135–145)
SODIUM SERPL-SCNC: 139 MMOL/L — SIGNIFICANT CHANGE UP (ref 135–145)
WBC # BLD: 10.87 K/UL — HIGH (ref 3.8–10.5)
WBC # FLD AUTO: 10.87 K/UL — HIGH (ref 3.8–10.5)

## 2020-11-27 RX ORDER — LANOLIN ALCOHOL/MO/W.PET/CERES
1 CREAM (GRAM) TOPICAL
Qty: 0 | Refills: 0 | DISCHARGE
Start: 2020-11-27

## 2020-11-27 RX ORDER — ACETAMINOPHEN 500 MG
2 TABLET ORAL
Qty: 0 | Refills: 0 | DISCHARGE
Start: 2020-11-27

## 2020-11-27 RX ORDER — OXYCODONE HYDROCHLORIDE 5 MG/1
1 TABLET ORAL
Qty: 15 | Refills: 0
Start: 2020-11-27 | End: 2020-12-03

## 2020-11-27 RX ORDER — IBUPROFEN 200 MG
1 TABLET ORAL
Qty: 0 | Refills: 0 | DISCHARGE
Start: 2020-11-27

## 2020-11-27 RX ORDER — LANOLIN ALCOHOL/MO/W.PET/CERES
3 CREAM (GRAM) TOPICAL AT BEDTIME
Refills: 0 | Status: DISCONTINUED | OUTPATIENT
Start: 2020-11-27 | End: 2020-11-27

## 2020-11-27 RX ORDER — SODIUM CHLORIDE 9 MG/ML
1000 INJECTION, SOLUTION INTRAVENOUS ONCE
Refills: 0 | Status: COMPLETED | OUTPATIENT
Start: 2020-11-27 | End: 2020-11-27

## 2020-11-27 RX ADMIN — ENOXAPARIN SODIUM 40 MILLIGRAM(S): 100 INJECTION SUBCUTANEOUS at 11:39

## 2020-11-27 RX ADMIN — PIPERACILLIN AND TAZOBACTAM 25 GRAM(S): 4; .5 INJECTION, POWDER, LYOPHILIZED, FOR SOLUTION INTRAVENOUS at 14:08

## 2020-11-27 RX ADMIN — SODIUM CHLORIDE 1000 MILLILITER(S): 9 INJECTION, SOLUTION INTRAVENOUS at 12:30

## 2020-11-27 RX ADMIN — BUPROPION HYDROCHLORIDE 300 MILLIGRAM(S): 150 TABLET, EXTENDED RELEASE ORAL at 11:39

## 2020-11-27 RX ADMIN — LOSARTAN POTASSIUM 50 MILLIGRAM(S): 100 TABLET, FILM COATED ORAL at 05:02

## 2020-11-27 RX ADMIN — Medication 400 MILLIGRAM(S): at 05:32

## 2020-11-27 RX ADMIN — Medication 400 MILLIGRAM(S): at 05:02

## 2020-11-27 RX ADMIN — PIPERACILLIN AND TAZOBACTAM 25 GRAM(S): 4; .5 INJECTION, POWDER, LYOPHILIZED, FOR SOLUTION INTRAVENOUS at 05:02

## 2020-11-27 NOTE — DISCHARGE NOTE NURSING/CASE MANAGEMENT/SOCIAL WORK - PATIENT PORTAL LINK FT
You can access the FollowMyHealth Patient Portal offered by [facility] by registering at the following website: [email or web address]. By joining [employer]’s FollowMyHealth portal, you will also be able to view your health information using other applications (apps) compatible with our system.

## 2020-11-27 NOTE — PROGRESS NOTE ADULT - ASSESSMENT
84yMale patient several hours S/P lap appendectomy, recovering well.    Plan:  - IV Abx: zosyn  - Pain control PRN  - Diet: Reg  - Activity: as tolerated  - DVT ppx: lovenox  - likely dc home today    Red surgery [id number]     84yMale patient several hours S/P lap appendectomy for perforated appendicitis, recovering well.    Plan:  - IV Abx: zosyn  - f/u labs  - Pain control PRN  - Diet: Reg  - Activity: as tolerated  - DVT ppx: lovenox  - likely dc home today with po abx    Red surgery 1230

## 2020-11-27 NOTE — DISCHARGE NOTE PROVIDER - NSDCFUADDAPPT_GEN_ALL_CORE_FT
Your PMD--Please call for a follow up appointment in the next 1-2 weeks regarding your recent surgery and hospitalization.

## 2020-11-27 NOTE — PHYSICAL THERAPY INITIAL EVALUATION ADULT - PERTINENT HX OF CURRENT PROBLEM, REHAB EVAL
85 y/o male with PMhx of dementia, HLD, HTN, hx of prostate cancer who presents from PCP with appendicitis. The patient will be added on for laparoscopic appendectomy pending evaluation by Dr. Dickerson in AM. He may need additional workup due to patient age and possibility for malignancy. 85 y/o male with PMhx of dementia, HLD, HTN, hx of prostate cancer who presents from PCP with appendicitis.  s/p lap appy, may need workup due to patient age and possibility for malignancy.

## 2020-11-27 NOTE — PHYSICAL THERAPY INITIAL EVALUATION ADULT - CRITERIA FOR SKILLED THERAPEUTIC INTERVENTIONS
home no skilled PT needs but needs supervision for amb and ADL;s due to dementia/anticipated equipment needs at discharge

## 2020-11-27 NOTE — PHYSICAL THERAPY INITIAL EVALUATION ADULT - DID THE PATIENT HAVE SURGERY?
n/a Laparoscopic appendectomy: perforated appendix with walled off small abscess (drained). hemostasis achieved./yes

## 2020-11-27 NOTE — DISCHARGE NOTE PROVIDER - NSDCMRMEDTOKEN_GEN_ALL_CORE_FT
acetaminophen 325 mg oral tablet: 2 tab(s) orally every 6 hours, As needed, Mild Pain (1 - 3)  buPROPion 300 mg/24 hours (XL) oral tablet, extended release: 1 tab(s) orally every 24 hours  ibuprofen 400 mg oral tablet: 1 tab(s) orally every 6 hours  losartan 50 mg oral tablet: 1 tab(s) orally once a day  melatonin 3 mg oral tablet: 1 tab(s) orally once a day (at bedtime)  memantine 10 mg oral tablet: 1 tab(s) orally once a day  oxyCODONE 5 mg oral tablet: 1-2  tab(s) orally every 4-6 hours, As Needed MDD:6  simvastatin 40 mg oral tablet: 1 tab(s) orally once a day (at bedtime)  tamsulosin 0.4 mg oral capsule: 1 cap(s) orally once a day   acetaminophen 325 mg oral tablet: 2 tab(s) orally every 6 hours, As needed, Mild Pain (1 - 3)  Augmentin 875 mg-125 mg oral tablet: 1 tab(s) orally every 12 hours  buPROPion 300 mg/24 hours (XL) oral tablet, extended release: 1 tab(s) orally every 24 hours  ibuprofen 400 mg oral tablet: 1 tab(s) orally every 6 hours  losartan 50 mg oral tablet: 1 tab(s) orally once a day  melatonin 3 mg oral tablet: 1 tab(s) orally once a day (at bedtime)  memantine 10 mg oral tablet: 1 tab(s) orally once a day  oxyCODONE 5 mg oral tablet: 1-2  tab(s) orally every 4-6 hours, As Needed MDD:6  simvastatin 40 mg oral tablet: 1 tab(s) orally once a day (at bedtime)  tamsulosin 0.4 mg oral capsule: 1 cap(s) orally once a day

## 2020-11-27 NOTE — PHYSICAL THERAPY INITIAL EVALUATION ADULT - ADDITIONAL COMMENTS
lives w/ lives w/ wife and children in private house, 2-3 steps to enter w/ handrail/ flight of stairs to bedroom, does not use assistive device, glasses all the time, hearing good, Left handed

## 2020-11-27 NOTE — PROGRESS NOTE ADULT - SUBJECTIVE AND OBJECTIVE BOX
Surgery Progress Note    SUBJECTIVE: Pt seen and examined at bedside. Patient comfortable and in no-apparent distress. Has not eaten yet since surgery. Pain is controlled, no nausea or vomiting.    Vital Signs Last 24 Hrs  T(C): 36.7 (27 Nov 2020 04:27), Max: 37.1 (26 Nov 2020 10:27)  T(F): 98.1 (27 Nov 2020 04:27), Max: 98.7 (26 Nov 2020 10:27)  HR: 77 (27 Nov 2020 04:27) (68 - 95)  BP: 105/61 (27 Nov 2020 04:27) (93/55 - 124/78)  BP(mean): 80 (26 Nov 2020 17:15) (70 - 83)  RR: 18 (27 Nov 2020 04:27) (15 - 18)  SpO2: 96% (27 Nov 2020 04:27) (94% - 100%)    Physical Exam:  General Appearance: Appears well, NAD  Respiratory: No labored breathing  CV: Pulse regularly present  Abdomen: Soft, nontender, port incisions c/d/i    LABS:                        12.4   11.40 )-----------( 188      ( 26 Nov 2020 07:18 )             36.1     11-26    137  |  100  |  17  ----------------------------<  98  4.2   |  24  |  1.18    Ca    9.1      26 Nov 2020 07:18  Phos  3.2     11-26  Mg     2.0     11-26    TPro  6.6  /  Alb  3.8  /  TBili  0.7  /  DBili  x   /  AST  16  /  ALT  15  /  AlkPhos  61  11-25    PT/INR - ( 25 Nov 2020 19:54 )   PT: 13.6 sec;   INR: 1.14 ratio         PTT - ( 25 Nov 2020 19:54 )  PTT:28.8 sec      INs and OUTs:    11-26-20 @ 07:01  -  11-27-20 @ 07:00  --------------------------------------------------------  IN: 680 mL / OUT: 450 mL / NET: 230 mL

## 2020-11-27 NOTE — DISCHARGE NOTE PROVIDER - NSDCCPTREATMENT_GEN_ALL_CORE_FT
PRINCIPAL PROCEDURE  Procedure: Lap appendectomy  Findings and Treatment: Laparoscopic appendectomy: perforated appendix with walled off small abscess (drained). hemostasis achieved.

## 2020-11-27 NOTE — DISCHARGE NOTE PROVIDER - NSDCCPCAREPLAN_GEN_ALL_CORE_FT
PRINCIPAL DISCHARGE DIAGNOSIS  Diagnosis: Appendicitis  Assessment and Plan of Treatment: Activity- No heavy lifting or straining over 15 lbs for the next two weeks;  Driving- Please do not drive until your pain is well controlled and you do not need to take pain medications.  You may shower-Do not submerge or scrub incision sites.  Please pat dry incisions/dressings.  Leave the white steri strips in place, they will fall off on their own in approximately 5-7 days.

## 2020-11-27 NOTE — DISCHARGE NOTE PROVIDER - CARE PROVIDER_API CALL
Bert Dickerson)  Surgery  [address] [facility] [address], [address]  [place] [address]  Phone: [phone]  Fax: [phone]  Follow Up Time: 2 weeks

## 2020-11-27 NOTE — DISCHARGE NOTE PROVIDER - HOSPITAL COURSE
Mr. Mckenna is an 84 year old male with PMhx of dementia, HLD, HTN, hx of prostate cancer who presents from PCP with appendicitis.  Outside CT: thickened and dilated appendix with significant para-appendiceal inflammation, cecal thickening, and infiltration into the peritoneum with thickening in the RLQ.   Patient was admitted, placed on IV ABX / NPO and underwent a Laparoscopic appendectomy. The patient tolerated the procedure well, there were no complications. The patient was extubated in the OR, transferred to the PACU in stable condition and then transferred to a surgical floor. Diet was advanced as tolerated. He was seen by physical therapy which recommended discharge to _____. The patient's pain was controlled and they were placed back on their home medications.  At the time of discharge, the patient was hemodynamically stable, tolerating PO diet, voiding urine, ambulating and was comfortable with adequate pain control. The patient was instructed to follow up with Dr. Dickerson within 1-2 weeks after discharge. The patient felt comfortable with discharge. The patient was discharged to ____.  The patient had no other issues. Mr. Mckenna is an 84 year old male with PMhx of dementia, HLD, HTN, hx of prostate cancer who presents from PCP with appendicitis.  Outside CT: thickened and dilated appendix with significant para-appendiceal inflammation, cecal thickening, and infiltration into the peritoneum with thickening in the RLQ.   Patient was admitted, placed on IV ABX / NPO and underwent a Laparoscopic appendectomy. The patient tolerated the procedure well, there were no complications. The patient was extubated in the OR, transferred to the PACU in stable condition and then transferred to a surgical floor. Diet was advanced as tolerated. He was seen by physical therapy which recommended discharge to home/ no needs. The patient's pain was controlled and they were placed back on their home medications.  At the time of discharge, the patient was hemodynamically stable, tolerating PO diet, voiding urine, ambulating and was comfortable with adequate pain control. The patient was instructed to follow up with Dr. Dickerson within 1-2 weeks after discharge. The patient felt comfortable with discharge. The patient was discharged home w PO Abx course x 1 week.  The patient had no other issues.

## 2020-12-01 LAB — SURGICAL PATHOLOGY STUDY: SIGNIFICANT CHANGE UP

## 2020-12-28 PROCEDURE — 97161 PT EVAL LOW COMPLEX 20 MIN: CPT

## 2020-12-28 PROCEDURE — C9399: CPT

## 2020-12-28 PROCEDURE — 80053 COMPREHEN METABOLIC PANEL: CPT

## 2020-12-28 PROCEDURE — U0003: CPT

## 2020-12-28 PROCEDURE — 86900 BLOOD TYPING SEROLOGIC ABO: CPT

## 2020-12-28 PROCEDURE — 88304 TISSUE EXAM BY PATHOLOGIST: CPT

## 2020-12-28 PROCEDURE — 85025 COMPLETE CBC W/AUTO DIFF WBC: CPT

## 2020-12-28 PROCEDURE — 86850 RBC ANTIBODY SCREEN: CPT

## 2020-12-28 PROCEDURE — 86901 BLOOD TYPING SEROLOGIC RH(D): CPT

## 2020-12-28 PROCEDURE — C1889: CPT

## 2020-12-28 PROCEDURE — 96374 THER/PROPH/DIAG INJ IV PUSH: CPT

## 2020-12-28 PROCEDURE — 99285 EMERGENCY DEPT VISIT HI MDM: CPT | Mod: 25

## 2020-12-28 PROCEDURE — 86769 SARS-COV-2 COVID-19 ANTIBODY: CPT

## 2020-12-28 PROCEDURE — 85730 THROMBOPLASTIN TIME PARTIAL: CPT

## 2020-12-28 PROCEDURE — 84100 ASSAY OF PHOSPHORUS: CPT

## 2020-12-28 PROCEDURE — 85027 COMPLETE CBC AUTOMATED: CPT

## 2020-12-28 PROCEDURE — 85610 PROTHROMBIN TIME: CPT

## 2020-12-28 PROCEDURE — 83690 ASSAY OF LIPASE: CPT

## 2020-12-28 PROCEDURE — 83735 ASSAY OF MAGNESIUM: CPT

## 2020-12-28 PROCEDURE — 80048 BASIC METABOLIC PNL TOTAL CA: CPT

## 2021-08-24 ENCOUNTER — APPOINTMENT (OUTPATIENT)
Age: 86
End: 2021-08-24

## 2022-05-10 NOTE — H&P ADULT - REASON FOR ADMISSION
Physical Therapy  . [x] daily progress note       [] discharge       Patient Name:  Sebastian Heredia   :  1936 MRN: [id number]  Room:  [address] [place] [address] Date of Admission: 2022  Rehabilitation Diagnosis:   Diverticulitis of intestine, part unspecified, without perforation or abscess without bleeding [K57.92]  Diverticulitis [K57.92]       Date 5/10/2022       Day of ARU Week:  2   Time IN/OUT 1315/1415   Individual Tx Minutes 60   Group Tx Minutes    Co-Treat Minutes    Concurrent Tx Minutes    TOTAL Tx Time Mins 60   Variance Time    Variance Time []   Refusal due to:     []   Medical hold/reason:    []   Illness   []   Off Unit for test/procedure  []   Extra time needed to complete task  []   Therapeutic need  []   Other (specify):   Restrictions Restrictions/Precautions  Restrictions/Precautions: Fall Risk,Contact Precautions (C-diff)      Interdisciplinary communication [x]   Cleared for therapy per nursing     []   RN notified about issues during session  []   RN updated on pt performance  []   Spoke with   []   Spoke with OT  []   Spoke with MD  []   Other:    Subjective observations and cognitive status: Pt in recliner, agreeable to therapy. Pain level/location:   3 /10       Location: L hip after ex   Discharge recommendations  Anticipated discharge date:    Destination: []home alone   [x]home alone with assist PRN     [] home w/ family      [] Continuous supervision  []SNF    [] Assisted living     [] Other:  Continued therapy: [x]HHC PT  []OUTPATIENT  PT   [] No Further PT  []SNF PT  Caregiver training recommended: []Yes  [x] No   Equipment needs: pt has 2ww and cane     Bed Mobility:           [x]   Pt received out of bed       Transfers:    Sit--> Stand: Mod I  Stand --> Sit:   Mod I  Chair-->Bed/Bed --> Chair: supervision with cues for walker   Block practice with chair to chair transfers with visual and verbal demonstration for correct technique vs her technique.  Pt improved in Abdominal Pain safety after block training, but will continue to assess carryover. Gait:    Distance:  350', 200' , 150'  Assistance:  Distal supervision. With no distractions, pt able to ambulate mod I. When distractions or obstacles introduced that require problem solving pt needs supervision for verbal cues  Device:  2ww  Introduced cane and ambulated in room 120' with 4 turns with pt demonstrating decreased trunk rotation and arm swing and mild path deviation, SBA        Additional Therapeutic activities/exercises completed this date:     []   Nu-step:  Time:        Level:         #Steps:       []   Rebounder:    []  Seated     []  Standing        []   Balance training         []   Postural training    []   Supine ther ex (reps/sets):     []   Seated ther ex (reps/sets):     [x]   Standing ther ex (reps/sets): Pt required 50% cues for technique for standing HEP of mini squats, heel/toe lifts, abduction, knee flexion, marching 10 x1.      []   Picking up object from floor (standing):                   []   Reacher used   []   Other:   []   Other:    Comments:      Patient/Caregiver Education and Training:   []   Role of PT  []   Education about Dx  [x]   Use of call light for assist   []   Wheelchair mobility/management  [x]   HEP provided and explained   [x]   Treatment plan reviewed  []   Home safety  []   Body mechanics  [x]   Positioning  [x]   Bed Mobility/Transfer technique  [x]   Gait technique/sequencing  [x]   Proper use of assistive device/adaptive equipment  []   Stair training/Advanced mobility safety and technique  []   Reinforced patient's precautions/mobility while maintaining precautions  []   Postural awareness  []   Family/caregiver training  []   Progress was updated and reviewed in Rehabtracker with patient and/or family this date. []   Other:      Treatment Plan for Next Session: QI     Assessment:    Assessment:   This pt demonstrated a positive   response to today's treatment as evidenced by progression to trial with cane for gait, improved transfer carryover for sit to stand. The patient is making progress toward established goals as evidenced by QI scores. Treatment/Activity Tolerance:   [] Tolerated treatment with no adverse effects    [] Patient limited by fatigue  [x] Patient limited by pain   [] Patient limited by medical complications:    [] Adverse reaction to Tx:   [] Significant change in status    Barrier/s to progress/learning:   []   None  []   Cognition  []   Hearing deficit  []   Pre-morbid mental/psychological status   []   Motivation  []   Communication  [x]   Anxiety  []   Vision deficit  []   Attention  []   Other:      Safety:       []  bed alarm set    [x]  chair alarm set    []  Pt refused alarms                []  Telesitter activated      [x]  Gait belt used during tx session      []other:         Number of Minutes/Billable Intervention  Gait Training 30   Therapeutic Exercise 10   Neuro Re-Ed    Therapeutic Activity 20   Wheelchair Propulsion    Group    Other:    TOTAL 60         Social History  Social/Functional History  Lives With: Alone  Type of Home: House  Home Layout: One level (Pt has basement but does not use. Has stair lift but has never been used.)  Home Access: Stairs to enter with rails  Entrance Stairs - Number of Steps: 3-4 EV  Entrance Stairs - Rails: Both  Bathroom Shower/Tub: Tub/Shower unit,Shower chair with back  Bathroom Toilet: Handicap height (Pt states frame over toilet c grab bars.)  Bathroom Equipment: Grab bars in shower,Shower chair,Grab bars around toilet,Hand-held shower  Bathroom Accessibility: Accessible  Home Equipment: Cane,Walker, rolling,Grab bars,Reacher  Has the patient had two or more falls in the past year or any fall with injury in the past year?: No  Receives Help From: Family (Luisa Abreu lives 4 doors down.  He assists c grocery shopping and house cleaning.)  ADL Assistance: Independent  Homemaking Assistance: Needs assistance (chika does cleaning and grocery shopping)  Homemaking Responsibilities: Yes  Meal Prep Responsibility: Primary  Laundry Responsibility: Primary  Cleaning Responsibility: Primary  Bill Paying/Finance Responsibility: No (Chika took over ~ 2 months ago as Pt states she began \"having a hard time remembering\")  Shopping Responsibility: Primary  Health Care Management: Secondary (Pt and grandson complete together.)  Ambulation Assistance: Independent (Pt reports she would use a cane at night and would take it in public just incase. Pt primarily Ind without device.)  Transfer Assistance: Independent  Active : Yes  Mode of Transportation: Car  Occupation: Retired  Type of Occupation:   Leisure & Hobbies: Pt enjoys watching television, cooking, and being active. Additional Comments: Pt has regular, flat bed at home; no falls in the past year    Objective                                                                                    Goals:  (Update in navigator)   : Long Term Goals  Time Frame for Long term goals : 10-12 tx days:  Long term goal 1: Pt will complete sup<->sit Ind. Long term goal 2: Pt will complete OOB transfers using 2WW Mod Ind. Long term goal 3: Pt will ambulate 10 ft and 50 ft with turns using 2WW Mod Ind. Long term goal 4: Pt will ambulate 150 ft over level surface and at least 10 ft of uneven surface using 2WW with SBA. Long term goal 5: Pt will ascend/descend curb step using 2WW and 1 flight of stairs using railings with SBA-Sup. Additional Goals?: Yes  Long term goal 6: Pt will complete object retrieval from the floor with 2WW and reacher Mod Ind. Long term goal 7: Pt will propel manual w/c 150 ft with turns Mod Ind.:        Plan of Care                                                                              Times per week: 5 days per week for a minimum of 60 minutes/day plus group as appropriate for 60 minutes.   Treatment to include Current Treatment Recommendations: Strengthening,Balance training,Functional mobility training,Transfer training,Endurance training,Gait training,Stair training,Home exercise program,Safety education & training,Patient/Caregiver education & training,Equipment evaluation, education, & procurement,Therapeutic activities,Wheelchair mobility training    Electronically signed by   Diana Garcia PT,  5/10/2022, 1:27 PM

## 2022-07-22 ENCOUNTER — NON-APPOINTMENT (OUTPATIENT)
Age: 87
End: 2022-07-22

## 2022-07-24 ENCOUNTER — TRANSCRIPTION ENCOUNTER (OUTPATIENT)
Age: 87
End: 2022-07-24

## 2022-07-25 ENCOUNTER — TRANSCRIPTION ENCOUNTER (OUTPATIENT)
Age: 87
End: 2022-07-25

## 2022-07-25 PROBLEM — F03.90 UNSPECIFIED DEMENTIA, UNSPECIFIED SEVERITY, WITHOUT BEHAVIORAL DISTURBANCE, PSYCHOTIC DISTURBANCE, MOOD DISTURBANCE, AND ANXIETY: Chronic | Status: ACTIVE | Noted: 2020-11-25

## 2022-07-25 PROBLEM — I10 ESSENTIAL (PRIMARY) HYPERTENSION: Chronic | Status: ACTIVE | Noted: 2020-11-25

## 2022-07-25 PROBLEM — Z85.46 PERSONAL HISTORY OF MALIGNANT NEOPLASM OF PROSTATE: Chronic | Status: ACTIVE | Noted: 2020-11-25

## 2022-07-25 PROBLEM — F32.9 MAJOR DEPRESSIVE DISORDER, SINGLE EPISODE, UNSPECIFIED: Chronic | Status: ACTIVE | Noted: 2020-11-25

## 2022-07-25 PROBLEM — E78.5 HYPERLIPIDEMIA, UNSPECIFIED: Chronic | Status: ACTIVE | Noted: 2020-11-25

## 2022-07-28 ENCOUNTER — APPOINTMENT (OUTPATIENT)
Dept: DISASTER EMERGENCY | Facility: HOSPITAL | Age: 87
End: 2022-07-28

## 2022-07-28 ENCOUNTER — TRANSCRIPTION ENCOUNTER (OUTPATIENT)
Age: 87
End: 2022-07-28

## 2022-07-28 ENCOUNTER — OUTPATIENT (OUTPATIENT)
Dept: OUTPATIENT SERVICES | Facility: HOSPITAL | Age: 87
LOS: 1 days | End: 2022-07-28

## 2022-07-28 VITALS
SYSTOLIC BLOOD PRESSURE: 135 MMHG | DIASTOLIC BLOOD PRESSURE: 77 MMHG | HEART RATE: 70 BPM | OXYGEN SATURATION: 97 % | WEIGHT: 136.03 LBS | TEMPERATURE: 98 F | RESPIRATION RATE: 17 BRPM | HEIGHT: 63 IN

## 2022-07-28 VITALS
HEART RATE: 67 BPM | TEMPERATURE: 99 F | RESPIRATION RATE: 17 BRPM | SYSTOLIC BLOOD PRESSURE: 135 MMHG | DIASTOLIC BLOOD PRESSURE: 81 MMHG

## 2022-07-28 DIAGNOSIS — U07.1 COVID-19: ICD-10-CM

## 2022-07-28 RX ORDER — BEBTELOVIMAB 87.5 MG/ML
175 INJECTION, SOLUTION INTRAVENOUS ONCE
Refills: 0 | Status: COMPLETED | OUTPATIENT
Start: 2022-07-28 | End: 2022-07-28

## 2022-07-28 RX ADMIN — BEBTELOVIMAB 175 MILLIGRAM(S): 87.5 INJECTION, SOLUTION INTRAVENOUS at 11:52

## 2022-07-28 NOTE — CHART NOTE - NSCHARTNOTEFT_GEN_A_CORE
CC: Monoclonal Antibody Infusion/COVID 19 Positive  86y Male with PMHx of Dementia, HTn, Prostate CA 2019 s/p RT, HLD and recent dx of COVID 19+ who presents today for elective Bebtelovimab. Patient has been screened and was deemed to be a candidate.    Symptoms/ Criteria  Date of Symptom Onset: 7/23/22  Symptoms: sore throat, cough, fever, body ache   Date of Positive COVID PCR: 7/24/22  Risk Profile includes:       Vaccination Status: Moderna x3    PMHx:  Infection due to severe acute respiratory syndrome coronavirus 2 (SARS-CoV-2)    HTN (hypertension)    HLD (hyperlipidemia)    Dementia    Depression    Personal history of prostate cancer        Exam/findings:  T(C): --  HR: --  BP: --  RR: --  SpO2: --    PE:   Appearance: NAD	  HEENT:  NC/AT  Cardiovascular:  No edema  Respiratory: no use of accessory muscles  Gastrointestinal:  non-distended   Skin: warm and dry  Neurologic: Non-focal  Extremities: Normal range of motion    ASSESSMENT:  Pt is COVID positive with mild to moderate symptoms who was referred for elective MAB (Bebtelovimab).    PLAN:  - MAB treatment explained to patient. I have reviewed the Bebtelovimab Emergency Use Authorization (EUA) and I have provided the patient or patient's caregiver with the following information:   1. FDA has authorized emergency use of Bebtelovimab to be administered for the treatment of mild to moderate COVID-19, which is not an FDA-approved biological product.   2. The patient or patient's caregiver has the option to accept or refuse administration of MAB.   3. The significant known and potential risks and benefits of Bebtelovimab and the extent to which such risks and benefits are unknown.  4. Information on available alternative treatments and risks and benefits of those alternatives.  - Patient verbalized understanding of plan and agrees to treatment. All questions answered.  - Consent for MAB obtained.   - 175mg of Bebtelovimab administered as a single intravenous injection over at least 30 seconds.   - Observe patient for one hour post medication administration and then if stable, discharge home with outpatient follow up as planned by PCP.      POST ASSESSMENT:   Patient completed MAB, and monitored x 1 hour post-infusion with no adverse reactions noted, remained hemodynamically stable.  - Patient tolerated infusion well; denies complaints of chest pain/SOB/dizziness/palpitations.   - VSS for discharge home.  - D/C instructions given/ fact sheet included.  - Patient was instructed to self-isolate and use infection control measures (e.g wear mask, isolate, social distance, avoid sharing personal items, clean and disinfect "high touch" surfaces, and frequent handwashing according to the CDC guidelines.   - The patient was informed on what symptoms to be aware of for the next couple of days, and if there are any issues to call the 24/7 clinical call center. Patient was instructed to follow up with primary care provider as needed.

## 2022-08-12 NOTE — BRIEF OPERATIVE NOTE - NSEVIDNCEORABSCESS_GEN_ALL_CORE
PAST MEDICAL HISTORY:  Anxiety     Depression     Endometrial hyperplasia     HTN (hypertension)     Hyperlipidemia     Insomnia     Morbid obesity with BMI of 40.0-44.9, adult     BEATRICE (obstructive sleep apnea)     Osteoarthritis     Type 2 diabetes mellitus     
purulence

## 2022-09-16 ENCOUNTER — APPOINTMENT (OUTPATIENT)
Dept: ORTHOPEDIC SURGERY | Facility: CLINIC | Age: 87
End: 2022-09-16

## 2022-09-16 ENCOUNTER — NON-APPOINTMENT (OUTPATIENT)
Age: 87
End: 2022-09-16

## 2022-09-16 VITALS
HEART RATE: 70 BPM | SYSTOLIC BLOOD PRESSURE: 138 MMHG | WEIGHT: 129 LBS | HEIGHT: 62 IN | DIASTOLIC BLOOD PRESSURE: 81 MMHG | BODY MASS INDEX: 23.74 KG/M2

## 2022-09-16 DIAGNOSIS — M25.562 PAIN IN LEFT KNEE: ICD-10-CM

## 2022-09-16 PROCEDURE — 99203 OFFICE O/P NEW LOW 30 MIN: CPT

## 2022-09-16 PROCEDURE — 73564 X-RAY EXAM KNEE 4 OR MORE: CPT | Mod: LT

## 2023-07-20 NOTE — ED ADULT NURSE NOTE - NS ED NOTE ABUSE SUSPICION NEGLECT YN
----- Message from Trista Crespo sent at 7/20/2023 10:01 AM CDT -----  PA for Diazepam has been approved for 7/18/23 - 1/17/24. Notified pharmacy. Thank you     No

## 2025-01-29 ENCOUNTER — INPATIENT (INPATIENT)
Facility: HOSPITAL | Age: 89
LOS: 18 days | Discharge: HOSPICE | End: 2025-02-17
Attending: STUDENT IN AN ORGANIZED HEALTH CARE EDUCATION/TRAINING PROGRAM | Admitting: STUDENT IN AN ORGANIZED HEALTH CARE EDUCATION/TRAINING PROGRAM
Payer: MEDICARE

## 2025-01-29 VITALS
SYSTOLIC BLOOD PRESSURE: 131 MMHG | TEMPERATURE: 98 F | WEIGHT: 156.97 LBS | OXYGEN SATURATION: 96 % | DIASTOLIC BLOOD PRESSURE: 88 MMHG | RESPIRATION RATE: 17 BRPM | HEART RATE: 67 BPM

## 2025-01-29 LAB
ALBUMIN SERPL ELPH-MCNC: 3.7 G/DL — SIGNIFICANT CHANGE UP (ref 3.3–5)
ALP SERPL-CCNC: 55 U/L — SIGNIFICANT CHANGE UP (ref 40–120)
ALT FLD-CCNC: 25 U/L — SIGNIFICANT CHANGE UP (ref 4–41)
ANION GAP SERPL CALC-SCNC: 13 MMOL/L — SIGNIFICANT CHANGE UP (ref 7–14)
APPEARANCE UR: CLEAR — SIGNIFICANT CHANGE UP
APTT BLD: 28.4 SEC — SIGNIFICANT CHANGE UP (ref 24.5–35.6)
AST SERPL-CCNC: 49 U/L — HIGH (ref 4–40)
BACTERIA # UR AUTO: NEGATIVE /HPF — SIGNIFICANT CHANGE UP
BASOPHILS # BLD AUTO: 0.02 K/UL — SIGNIFICANT CHANGE UP (ref 0–0.2)
BASOPHILS NFR BLD AUTO: 0.2 % — SIGNIFICANT CHANGE UP (ref 0–2)
BILIRUB SERPL-MCNC: 0.3 MG/DL — SIGNIFICANT CHANGE UP (ref 0.2–1.2)
BILIRUB UR-MCNC: NEGATIVE — SIGNIFICANT CHANGE UP
BLOOD GAS VENOUS COMPREHENSIVE RESULT: SIGNIFICANT CHANGE UP
BLOOD GAS VENOUS COMPREHENSIVE RESULT: SIGNIFICANT CHANGE UP
BUN SERPL-MCNC: 19 MG/DL — SIGNIFICANT CHANGE UP (ref 7–23)
CALCIUM SERPL-MCNC: 8.9 MG/DL — SIGNIFICANT CHANGE UP (ref 8.4–10.5)
CAST: 0 /LPF — SIGNIFICANT CHANGE UP (ref 0–4)
CHLORIDE SERPL-SCNC: 107 MMOL/L — SIGNIFICANT CHANGE UP (ref 98–107)
CO2 SERPL-SCNC: 21 MMOL/L — LOW (ref 22–31)
COLOR SPEC: YELLOW — SIGNIFICANT CHANGE UP
CREAT SERPL-MCNC: 1.22 MG/DL — SIGNIFICANT CHANGE UP (ref 0.5–1.3)
DIFF PNL FLD: ABNORMAL
EGFR: 57 ML/MIN/1.73M2 — LOW
EOSINOPHIL # BLD AUTO: 0.01 K/UL — SIGNIFICANT CHANGE UP (ref 0–0.5)
EOSINOPHIL NFR BLD AUTO: 0.1 % — SIGNIFICANT CHANGE UP (ref 0–6)
FLUAV AG NPH QL: SIGNIFICANT CHANGE UP
FLUBV AG NPH QL: SIGNIFICANT CHANGE UP
GLUCOSE SERPL-MCNC: 99 MG/DL — SIGNIFICANT CHANGE UP (ref 70–99)
GLUCOSE UR QL: NEGATIVE MG/DL — SIGNIFICANT CHANGE UP
HCT VFR BLD CALC: 39.5 % — SIGNIFICANT CHANGE UP (ref 39–50)
HGB BLD-MCNC: 13.2 G/DL — SIGNIFICANT CHANGE UP (ref 13–17)
IANC: 7.21 K/UL — SIGNIFICANT CHANGE UP (ref 1.8–7.4)
IMM GRANULOCYTES NFR BLD AUTO: 0.6 % — SIGNIFICANT CHANGE UP (ref 0–0.9)
INR BLD: 1.09 RATIO — SIGNIFICANT CHANGE UP (ref 0.85–1.16)
KETONES UR-MCNC: NEGATIVE MG/DL — SIGNIFICANT CHANGE UP
LEUKOCYTE ESTERASE UR-ACNC: NEGATIVE — SIGNIFICANT CHANGE UP
LYMPHOCYTES # BLD AUTO: 0.86 K/UL — LOW (ref 1–3.3)
LYMPHOCYTES # BLD AUTO: 9.1 % — LOW (ref 13–44)
MCHC RBC-ENTMCNC: 31 PG — SIGNIFICANT CHANGE UP (ref 27–34)
MCHC RBC-ENTMCNC: 33.4 G/DL — SIGNIFICANT CHANGE UP (ref 32–36)
MCV RBC AUTO: 92.7 FL — SIGNIFICANT CHANGE UP (ref 80–100)
MONOCYTES # BLD AUTO: 1.28 K/UL — HIGH (ref 0–0.9)
MONOCYTES NFR BLD AUTO: 13.6 % — SIGNIFICANT CHANGE UP (ref 2–14)
NEUTROPHILS # BLD AUTO: 7.21 K/UL — SIGNIFICANT CHANGE UP (ref 1.8–7.4)
NEUTROPHILS NFR BLD AUTO: 76.4 % — SIGNIFICANT CHANGE UP (ref 43–77)
NITRITE UR-MCNC: NEGATIVE — SIGNIFICANT CHANGE UP
NRBC # BLD AUTO: 0 K/UL — SIGNIFICANT CHANGE UP (ref 0–0)
NRBC # BLD: 0 /100 WBCS — SIGNIFICANT CHANGE UP (ref 0–0)
NRBC # FLD: 0 K/UL — SIGNIFICANT CHANGE UP (ref 0–0)
NRBC BLD-RTO: 0 /100 WBCS — SIGNIFICANT CHANGE UP (ref 0–0)
PH UR: 6.5 — SIGNIFICANT CHANGE UP (ref 5–8)
PLATELET # BLD AUTO: 206 K/UL — SIGNIFICANT CHANGE UP (ref 150–400)
POTASSIUM SERPL-MCNC: 4.5 MMOL/L — SIGNIFICANT CHANGE UP (ref 3.5–5.3)
POTASSIUM SERPL-SCNC: 4.5 MMOL/L — SIGNIFICANT CHANGE UP (ref 3.5–5.3)
PROT SERPL-MCNC: 6.3 G/DL — SIGNIFICANT CHANGE UP (ref 6–8.3)
PROT UR-MCNC: NEGATIVE MG/DL — SIGNIFICANT CHANGE UP
PROTHROM AB SERPL-ACNC: 12.6 SEC — SIGNIFICANT CHANGE UP (ref 9.9–13.4)
RBC # BLD: 4.26 M/UL — SIGNIFICANT CHANGE UP (ref 4.2–5.8)
RBC # FLD: 13.6 % — SIGNIFICANT CHANGE UP (ref 10.3–14.5)
RBC CASTS # UR COMP ASSIST: 53 /HPF — HIGH (ref 0–4)
RSV RNA NPH QL NAA+NON-PROBE: SIGNIFICANT CHANGE UP
SARS-COV-2 RNA SPEC QL NAA+PROBE: DETECTED
SODIUM SERPL-SCNC: 141 MMOL/L — SIGNIFICANT CHANGE UP (ref 135–145)
SP GR SPEC: 1.01 — SIGNIFICANT CHANGE UP (ref 1–1.03)
SQUAMOUS # UR AUTO: 0 /HPF — SIGNIFICANT CHANGE UP (ref 0–5)
UROBILINOGEN FLD QL: 0.2 MG/DL — SIGNIFICANT CHANGE UP (ref 0.2–1)
WBC # BLD: 9.44 K/UL — SIGNIFICANT CHANGE UP (ref 3.8–10.5)
WBC # FLD AUTO: 9.44 K/UL — SIGNIFICANT CHANGE UP (ref 3.8–10.5)
WBC UR QL: 0 /HPF — SIGNIFICANT CHANGE UP (ref 0–5)

## 2025-01-29 PROCEDURE — 71045 X-RAY EXAM CHEST 1 VIEW: CPT | Mod: 26

## 2025-01-29 PROCEDURE — 99291 CRITICAL CARE FIRST HOUR: CPT

## 2025-01-29 PROCEDURE — 70450 CT HEAD/BRAIN W/O DYE: CPT | Mod: 26

## 2025-01-29 RX ORDER — DILTIAZEM HYDROCHLORIDE 240 MG/1
10 TABLET, EXTENDED RELEASE ORAL ONCE
Refills: 0 | Status: COMPLETED | OUTPATIENT
Start: 2025-01-29 | End: 2025-01-29

## 2025-01-29 RX ORDER — VANCOMYCIN HCL IN 5 % DEXTROSE 1.5G/250ML
1000 PLASTIC BAG, INJECTION (ML) INTRAVENOUS ONCE
Refills: 0 | Status: COMPLETED | OUTPATIENT
Start: 2025-01-29 | End: 2025-01-29

## 2025-01-29 RX ORDER — ACETAMINOPHEN 500 MG/5ML
1000 LIQUID (ML) ORAL ONCE
Refills: 0 | Status: COMPLETED | OUTPATIENT
Start: 2025-01-29 | End: 2025-01-29

## 2025-01-29 RX ORDER — HALOPERIDOL 10 MG/1
2.5 TABLET ORAL ONCE
Refills: 0 | Status: COMPLETED | OUTPATIENT
Start: 2025-01-29 | End: 2025-01-29

## 2025-01-29 RX ORDER — SODIUM CHLORIDE 9 G/1000ML
500 INJECTION, SOLUTION INTRAVENOUS ONCE
Refills: 0 | Status: COMPLETED | OUTPATIENT
Start: 2025-01-29 | End: 2025-01-29

## 2025-01-29 RX ORDER — PIPERACILLIN-TAZO-DEXTROSE,ISO 3.375G/5
3.38 IV SOLUTION, PIGGYBACK PREMIX FROZEN(ML) INTRAVENOUS ONCE
Refills: 0 | Status: COMPLETED | OUTPATIENT
Start: 2025-01-29 | End: 2025-01-29

## 2025-01-29 RX ORDER — SODIUM CHLORIDE 9 G/1000ML
1000 INJECTION, SOLUTION INTRAVENOUS ONCE
Refills: 0 | Status: DISCONTINUED | OUTPATIENT
Start: 2025-01-29 | End: 2025-01-29

## 2025-01-29 RX ADMIN — Medication 200 GRAM(S): at 16:54

## 2025-01-29 RX ADMIN — DILTIAZEM HYDROCHLORIDE 10 MILLIGRAM(S): 240 TABLET, EXTENDED RELEASE ORAL at 22:54

## 2025-01-29 RX ADMIN — DILTIAZEM HYDROCHLORIDE 10 MILLIGRAM(S): 240 TABLET, EXTENDED RELEASE ORAL at 22:02

## 2025-01-29 RX ADMIN — Medication 400 MILLIGRAM(S): at 16:54

## 2025-01-29 RX ADMIN — Medication 250 MILLIGRAM(S): at 18:03

## 2025-01-29 RX ADMIN — HALOPERIDOL 2.5 MILLIGRAM(S): 10 TABLET ORAL at 19:47

## 2025-01-29 RX ADMIN — HALOPERIDOL 2.5 MILLIGRAM(S): 10 TABLET ORAL at 16:54

## 2025-01-29 RX ADMIN — Medication 1000 MILLILITER(S): at 18:03

## 2025-01-29 RX ADMIN — Medication 1000 MILLILITER(S): at 19:47

## 2025-01-29 RX ADMIN — SODIUM CHLORIDE 1000 MILLILITER(S): 9 INJECTION, SOLUTION INTRAVENOUS at 22:53

## 2025-01-29 NOTE — ED ADULT TRIAGE NOTE - AS TEMP SITE
Additional Notes: Metrocream vs doxycycline were discussed\\nRisk vs benefit were discussed \\nRecommended sticking with one product line\\nRecommended CeraVe brand moisturizer \\nPatient elects to try  metrocream and doxycycline\\nRecommended zinc based sunscreen. Detail Level: Simple oral

## 2025-01-29 NOTE — ED PROVIDER NOTE - CLINICAL SUMMARY MEDICAL DECISION MAKING FREE TEXT BOX
89 y/o male pmh htn, hld, dementia, prostate CA BIBEMS for AMS after being found on the ground at home- Pt is ill appearing, tachy to 160s, febrile, AMS on exam not following commands, very agitated, no murmur, lungs cta b/l, abd soft nt nd, no focal neuro deficits, no rash, no obvious signs of trauma- Pt is in rapid afib and febrile, concern for sepsis as the cause of afib- will obtain labs, cultures, ekg,, CT head, CXR, IV fluids, IV tylenol, abx and reassess. Will not rate control at this time as afib likely 2/2 fever, once fever controlled, if HR still fast then will readdress possible rate control.

## 2025-01-29 NOTE — ED PROVIDER NOTE - OBJECTIVE STATEMENT
88 y/o male pmh htn, hld, dementia, prostate CA BIBEMS for AMS. As per son, pt fell yeesterday at home. Pt was more confused than nnormal but was able to get up and lay down in bed. Today family found pt on the ground next to the bed, confused. Of note, family member admits to sick contact at home who tested + for covid.

## 2025-01-29 NOTE — ED ADULT NURSE REASSESSMENT NOTE - NS ED NURSE REASSESS COMMENT FT1
Report from galehieri Emery. Pt is resting in the stretcher. Respirations are equal and unlabored bilaterally. Pt is on the continuous cardiac monitor. Pt is waiting for CT results. Stretcher is in the lowest position and safety maintained.

## 2025-01-29 NOTE — ED ADULT NURSE NOTE - OBJECTIVE STATEMENT
Pt. A&Ox0, brought to ED by family for AMS. hx of dementia. As per son, pt. fell yesterday at home. abrasions noted to bilateral knees. Pt. more confused that normal and was unable to ambulate at all. Unknown head trauma or LOC. Family tested covid positive 2 days ago and home test for patient was positive as well. #20g IV placed to right Ac, labs sent as ordered. Vitals documented, noted to be tachycardic to the 160s. MD at bedside for eval. Will continue to monitor.

## 2025-01-29 NOTE — ED ADULT NURSE REASSESSMENT NOTE - NS ED NURSE REASSESS COMMENT FT1
Straight cath chaperoned by EVER Farmre as ordered and following hospital policy. Urine collected and sent. Stretcher is in the lowest position and safety maintained.

## 2025-01-29 NOTE — ED PROVIDER NOTE - CRITICAL CARE ATTENDING CONTRIBUTION TO CARE
89-year-old male past medical history hypertension, anemia, dementia, prostate cancer presenting with son after fall yesterday and increased confusion at home.  No reported fevers or chills.  No shortness of breath, nausea vomiting abdominal pain.  + Sick contacts at home for COVID.    Exam as above    Plan: Labs, chest x-ray, CT head, IV fluids, symptom relief, reassess.  Patient found to be A-fib and RVR as well as febrile.  Suspect patient heart rate related to fever initially managed with IV hydration and antipyretic.  Heart rate still persistently elevated so given IV dose of diltiazem.  Patient signed out end my shift.

## 2025-01-29 NOTE — ED ADULT TRIAGE NOTE - CHIEF COMPLAINT QUOTE
pt brought from home with home health aide for AMS x2 days. per aide, pt tested positive for COVID x2 days ago and has become minimally verbal since diagnosis. pt noted to be tremourous, able to follow some commands. A&Ox0-1 (self) at this time. no respiratory distress noted   hx of dementia, HTN, HLD

## 2025-01-29 NOTE — ED ADULT NURSE NOTE - NSFALLRISKINTERV_ED_ALL_ED

## 2025-01-30 DIAGNOSIS — U07.1 COVID-19: ICD-10-CM

## 2025-01-30 DIAGNOSIS — E78.5 HYPERLIPIDEMIA, UNSPECIFIED: ICD-10-CM

## 2025-01-30 DIAGNOSIS — Z90.49 ACQUIRED ABSENCE OF OTHER SPECIFIED PARTS OF DIGESTIVE TRACT: Chronic | ICD-10-CM

## 2025-01-30 DIAGNOSIS — I48.91 UNSPECIFIED ATRIAL FIBRILLATION: ICD-10-CM

## 2025-01-30 DIAGNOSIS — I10 ESSENTIAL (PRIMARY) HYPERTENSION: ICD-10-CM

## 2025-01-30 DIAGNOSIS — F03.90 UNSPECIFIED DEMENTIA, UNSPECIFIED SEVERITY, WITHOUT BEHAVIORAL DISTURBANCE, PSYCHOTIC DISTURBANCE, MOOD DISTURBANCE, AND ANXIETY: ICD-10-CM

## 2025-01-30 DIAGNOSIS — N40.0 BENIGN PROSTATIC HYPERPLASIA WITHOUT LOWER URINARY TRACT SYMPTOMS: ICD-10-CM

## 2025-01-30 DIAGNOSIS — Z29.9 ENCOUNTER FOR PROPHYLACTIC MEASURES, UNSPECIFIED: ICD-10-CM

## 2025-01-30 DIAGNOSIS — G92.8 OTHER TOXIC ENCEPHALOPATHY: ICD-10-CM

## 2025-01-30 LAB
ALBUMIN SERPL ELPH-MCNC: 3.4 G/DL — SIGNIFICANT CHANGE UP (ref 3.3–5)
ALP SERPL-CCNC: 58 U/L — SIGNIFICANT CHANGE UP (ref 40–120)
ALT FLD-CCNC: 27 U/L — SIGNIFICANT CHANGE UP (ref 4–41)
ANION GAP SERPL CALC-SCNC: 12 MMOL/L — SIGNIFICANT CHANGE UP (ref 7–14)
AST SERPL-CCNC: 55 U/L — HIGH (ref 4–40)
BASOPHILS # BLD AUTO: 0.03 K/UL — SIGNIFICANT CHANGE UP (ref 0–0.2)
BASOPHILS NFR BLD AUTO: 0.3 % — SIGNIFICANT CHANGE UP (ref 0–2)
BILIRUB SERPL-MCNC: 0.5 MG/DL — SIGNIFICANT CHANGE UP (ref 0.2–1.2)
BUN SERPL-MCNC: 15 MG/DL — SIGNIFICANT CHANGE UP (ref 7–23)
CALCIUM SERPL-MCNC: 8.5 MG/DL — SIGNIFICANT CHANGE UP (ref 8.4–10.5)
CHLORIDE SERPL-SCNC: 103 MMOL/L — SIGNIFICANT CHANGE UP (ref 98–107)
CO2 SERPL-SCNC: 23 MMOL/L — SIGNIFICANT CHANGE UP (ref 22–31)
CREAT SERPL-MCNC: 1.02 MG/DL — SIGNIFICANT CHANGE UP (ref 0.5–1.3)
EGFR: 71 ML/MIN/1.73M2 — SIGNIFICANT CHANGE UP
EOSINOPHIL # BLD AUTO: 0.01 K/UL — SIGNIFICANT CHANGE UP (ref 0–0.5)
EOSINOPHIL NFR BLD AUTO: 0.1 % — SIGNIFICANT CHANGE UP (ref 0–6)
GAS PNL BLDV: SIGNIFICANT CHANGE UP
GLUCOSE SERPL-MCNC: 109 MG/DL — HIGH (ref 70–99)
HCT VFR BLD CALC: 41.5 % — SIGNIFICANT CHANGE UP (ref 39–50)
HGB BLD-MCNC: 14.1 G/DL — SIGNIFICANT CHANGE UP (ref 13–17)
IANC: 8.91 K/UL — HIGH (ref 1.8–7.4)
IMM GRANULOCYTES NFR BLD AUTO: 0.6 % — SIGNIFICANT CHANGE UP (ref 0–0.9)
LYMPHOCYTES # BLD AUTO: 0.84 K/UL — LOW (ref 1–3.3)
LYMPHOCYTES # BLD AUTO: 7.7 % — LOW (ref 13–44)
MAGNESIUM SERPL-MCNC: 2 MG/DL — SIGNIFICANT CHANGE UP (ref 1.6–2.6)
MCHC RBC-ENTMCNC: 31.3 PG — SIGNIFICANT CHANGE UP (ref 27–34)
MCHC RBC-ENTMCNC: 34 G/DL — SIGNIFICANT CHANGE UP (ref 32–36)
MCV RBC AUTO: 92.2 FL — SIGNIFICANT CHANGE UP (ref 80–100)
MONOCYTES # BLD AUTO: 1.02 K/UL — HIGH (ref 0–0.9)
MONOCYTES NFR BLD AUTO: 9.4 % — SIGNIFICANT CHANGE UP (ref 2–14)
NEUTROPHILS # BLD AUTO: 8.91 K/UL — HIGH (ref 1.8–7.4)
NEUTROPHILS NFR BLD AUTO: 81.9 % — HIGH (ref 43–77)
NRBC # BLD AUTO: 0 K/UL — SIGNIFICANT CHANGE UP (ref 0–0)
NRBC # BLD: 0 /100 WBCS — SIGNIFICANT CHANGE UP (ref 0–0)
NRBC # FLD: 0 K/UL — SIGNIFICANT CHANGE UP (ref 0–0)
NRBC BLD-RTO: 0 /100 WBCS — SIGNIFICANT CHANGE UP (ref 0–0)
PHOSPHATE SERPL-MCNC: 3.2 MG/DL — SIGNIFICANT CHANGE UP (ref 2.5–4.5)
PLATELET # BLD AUTO: 228 K/UL — SIGNIFICANT CHANGE UP (ref 150–400)
POTASSIUM SERPL-MCNC: 3.9 MMOL/L — SIGNIFICANT CHANGE UP (ref 3.5–5.3)
POTASSIUM SERPL-SCNC: 3.9 MMOL/L — SIGNIFICANT CHANGE UP (ref 3.5–5.3)
PROT SERPL-MCNC: 6.5 G/DL — SIGNIFICANT CHANGE UP (ref 6–8.3)
RBC # BLD: 4.5 M/UL — SIGNIFICANT CHANGE UP (ref 4.2–5.8)
RBC # FLD: 13.3 % — SIGNIFICANT CHANGE UP (ref 10.3–14.5)
SODIUM SERPL-SCNC: 138 MMOL/L — SIGNIFICANT CHANGE UP (ref 135–145)
WBC # BLD: 10.87 K/UL — HIGH (ref 3.8–10.5)
WBC # FLD AUTO: 10.87 K/UL — HIGH (ref 3.8–10.5)

## 2025-01-30 PROCEDURE — 99223 1ST HOSP IP/OBS HIGH 75: CPT

## 2025-01-30 RX ORDER — SODIUM CHLORIDE 9 G/1000ML
1000 INJECTION, SOLUTION INTRAVENOUS
Refills: 0 | Status: DISCONTINUED | OUTPATIENT
Start: 2025-01-30 | End: 2025-01-31

## 2025-01-30 RX ORDER — ACETAMINOPHEN 500 MG/5ML
1000 LIQUID (ML) ORAL ONCE
Refills: 0 | Status: COMPLETED | OUTPATIENT
Start: 2025-01-30 | End: 2025-01-30

## 2025-01-30 RX ORDER — PIPERACILLIN-TAZO-DEXTROSE,ISO 3.375G/5
3.38 IV SOLUTION, PIGGYBACK PREMIX FROZEN(ML) INTRAVENOUS ONCE
Refills: 0 | Status: COMPLETED | OUTPATIENT
Start: 2025-01-30 | End: 2025-01-30

## 2025-01-30 RX ORDER — REMDESIVIR 5 MG/ML
200 INJECTION INTRAVENOUS EVERY 24 HOURS
Refills: 0 | Status: COMPLETED | OUTPATIENT
Start: 2025-01-30 | End: 2025-01-30

## 2025-01-30 RX ORDER — SODIUM CHLORIDE 9 G/1000ML
1000 INJECTION, SOLUTION INTRAVENOUS
Refills: 0 | Status: DISCONTINUED | OUTPATIENT
Start: 2025-01-30 | End: 2025-01-30

## 2025-01-30 RX ORDER — REMDESIVIR 5 MG/ML
100 INJECTION INTRAVENOUS EVERY 24 HOURS
Refills: 0 | Status: COMPLETED | OUTPATIENT
Start: 2025-01-31 | End: 2025-02-03

## 2025-01-30 RX ORDER — PIPERACILLIN-TAZO-DEXTROSE,ISO 3.375G/5
3.38 IV SOLUTION, PIGGYBACK PREMIX FROZEN(ML) INTRAVENOUS ONCE
Refills: 0 | Status: DISCONTINUED | OUTPATIENT
Start: 2025-01-30 | End: 2025-01-30

## 2025-01-30 RX ORDER — METOPROLOL SUCCINATE 50 MG/1
5 TABLET, EXTENDED RELEASE ORAL ONCE
Refills: 0 | Status: COMPLETED | OUTPATIENT
Start: 2025-01-30 | End: 2025-01-30

## 2025-01-30 RX ORDER — REMDESIVIR 5 MG/ML
INJECTION INTRAVENOUS
Refills: 0 | Status: COMPLETED | OUTPATIENT
Start: 2025-01-30 | End: 2025-02-03

## 2025-01-30 RX ORDER — PIPERACILLIN-TAZO-DEXTROSE,ISO 3.375G/5
3.38 IV SOLUTION, PIGGYBACK PREMIX FROZEN(ML) INTRAVENOUS EVERY 8 HOURS
Refills: 0 | Status: DISCONTINUED | OUTPATIENT
Start: 2025-01-30 | End: 2025-01-30

## 2025-01-30 RX ORDER — ENOXAPARIN SODIUM 100 MG/ML
40 INJECTION SUBCUTANEOUS EVERY 24 HOURS
Refills: 0 | Status: DISCONTINUED | OUTPATIENT
Start: 2025-01-30 | End: 2025-02-15

## 2025-01-30 RX ORDER — PIPERACILLIN-TAZO-DEXTROSE,ISO 3.375G/5
3.38 IV SOLUTION, PIGGYBACK PREMIX FROZEN(ML) INTRAVENOUS EVERY 8 HOURS
Refills: 0 | Status: DISCONTINUED | OUTPATIENT
Start: 2025-01-30 | End: 2025-02-02

## 2025-01-30 RX ADMIN — Medication 400 MILLIGRAM(S): at 02:20

## 2025-01-30 RX ADMIN — SODIUM CHLORIDE 75 MILLILITER(S): 9 INJECTION, SOLUTION INTRAVENOUS at 11:00

## 2025-01-30 RX ADMIN — ENOXAPARIN SODIUM 40 MILLIGRAM(S): 100 INJECTION SUBCUTANEOUS at 11:00

## 2025-01-30 RX ADMIN — Medication 5 MILLIGRAM(S): at 13:05

## 2025-01-30 RX ADMIN — Medication 25 GRAM(S): at 15:53

## 2025-01-30 RX ADMIN — Medication 400 MILLIGRAM(S): at 22:55

## 2025-01-30 RX ADMIN — Medication 200 GRAM(S): at 03:28

## 2025-01-30 RX ADMIN — SODIUM CHLORIDE 75 MILLILITER(S): 9 INJECTION, SOLUTION INTRAVENOUS at 16:57

## 2025-01-30 RX ADMIN — REMDESIVIR 200 MILLIGRAM(S): 5 INJECTION INTRAVENOUS at 12:53

## 2025-01-30 RX ADMIN — Medication 1000 MILLIGRAM(S): at 23:55

## 2025-01-30 RX ADMIN — METOPROLOL SUCCINATE 5 MILLIGRAM(S): 50 TABLET, EXTENDED RELEASE ORAL at 06:14

## 2025-01-30 RX ADMIN — Medication 25 GRAM(S): at 07:07

## 2025-01-30 RX ADMIN — Medication 1000 MILLIGRAM(S): at 13:14

## 2025-01-30 RX ADMIN — SODIUM CHLORIDE 75 MILLILITER(S): 9 INJECTION, SOLUTION INTRAVENOUS at 04:42

## 2025-01-30 RX ADMIN — Medication 25 GRAM(S): at 21:36

## 2025-01-30 RX ADMIN — Medication 400 MILLIGRAM(S): at 12:56

## 2025-01-30 NOTE — H&P ADULT - PROBLEM SELECTOR PLAN 4
- Hold home memantine PO 10mg daily in setting of AMS  - Hold home buproprion PO 300mg XL daily in setting of AMS  - SLP eval for dysphagia when patient's mental status improves

## 2025-01-30 NOTE — H&P ADULT - HISTORY OF PRESENT ILLNESS
Mr Eric Mckenna is an 89 year old male with a PMHx of HTN, HLD, dementia, and prostate cancer BIBEMS for AMS and weakness. History provided by daughter Porsche as patient unable to provide it himself.  Patient fell at home the day prior to admission, and this morning was found on the floor next to his bed with scrapes on his knees.  He was unable to stand on his own.  At that point he was brought to the ED.  Notably, his other daughter was caring for him at the time and she tested positive for COVID 19.    On interview patient was muttering to himself and not attentive to interviewer.    Normally he cannot speak in complete sentences, has hallucinations, sometimes has some tremors.  Occasionally recognizes family members.    In the ED, patient was febrile and found to have AFib with RVR.  COVID positive.   Mr Eric Mckenna is an 89 year old male with a PMHx of HTN, HLD, dementia, and prostate cancer BIBEMS for AMS and weakness. History provided by daughter Porsche as patient unable to provide it himself.  Patient fell at home the day prior to admission, and this morning was found on the floor next to his bed with scrapes on his knees.  He was unable to stand on his own.  At that point he was brought to the ED.  Notably, his other daughter was caring for him at the time and she tested positive for COVID 19.    On interview patient was muttering to himself and not attentive to interviewer.    Normally he cannot speak in complete sentences, has hallucinations, sometimes has some tremors.  Occasionally recognizes family members.    In the ED, patient was febrile and found to have AFib with RVR.  COVID positive.  Was given 2.5L crystalloid, IV Tylenol, and 20mg diltiazem with improvement in RVR

## 2025-01-30 NOTE — PROVIDER CONTACT NOTE (OTHER) - ASSESSMENT
patient a&Ox0-MARAL due to cognition. Pt appears uncomfortable, restless. O2 saturating 93% on room air.

## 2025-01-30 NOTE — H&P ADULT - PROBLEM SELECTOR PLAN 7
- Hold home simvastatin 40mg PO daily in setting of AMS  - Hold home aspirin 81mg PO daily in setting of AMS (unclear why he takes aspirin)

## 2025-01-30 NOTE — ED ADULT NURSE REASSESSMENT NOTE - NS ED NURSE REASSESS COMMENT FT1
Pt is resting in the stretcher. Pt is on the continuous cardiac monitor. Respirations are equal and unlabored bilaterally. Pt is admitted to the hospital and waiting for a bed. Stretcher is in the lowest position and safety maintained.

## 2025-01-30 NOTE — PROVIDER CONTACT NOTE (OTHER) - ASSESSMENT
patient a&Ox0-MARAL due to cognition. Pt full body tremors occurring very frequently and quite severe.

## 2025-01-30 NOTE — H&P ADULT - ATTENDING COMMENTS
Pt with dementia, HTN, HLD, prostate ca p/w AMS and generalized weakness.  Pt found to be in AF with RVR in ED with HR to 160.  Pt febrile and found to have COVID on RVP.   On exam: pt confused, agitated, heart tachy, irregular, lungs CTA B, abd s/nt/nd BS normal  Labs reviewed   Pt with AF with RVR (appears to be new onset) in the setting of sepsis 2/2 COVID infection.  Will treat with remdesevir   O2 sat high 90's on RA  IV cardizem providing temporary rate control  Will give trial of IV metoprolol   Once MS improves, would be best to manage with PO Cardizem or metoprolol  If unable to take PO and persistently in RVR, may need to start Cardizem gtt  CHADSVASC score is 3, however given age, advanced dementia, and that AF is occurring in setting of acute infection, will hold off on a/c at this times  Will need cardiology consult in am.

## 2025-01-30 NOTE — H&P ADULT - PROBLEM SELECTOR PLAN 2
Patient has been altered from baseline dementia, likely in setting of sepsis.  - Patient has been altered from baseline dementia, likely in setting of sepsis.  - Will treat underlying sepsis  - Strict NPO for now pending further improvement in mental status  - Maintenance fluids PRN

## 2025-01-30 NOTE — H&P ADULT - PROBLEM SELECTOR PLAN 1
Patient fevering in the ED with AFib with RVR on admission.  Found to be COVID positive.  Meets sepsis criteria.  IMPROVING  - Remdesivir  - Continue pip-tazo 3.375mg TID Patient fevering in the ED with AFib with RVR on admission.  Found to be COVID positive.  Meets sepsis criteria.  IMPROVING  - Remdesivir 200mg initial dose followed by 100mg daily x 5 days total  - Continue pip-tazo 3.375mg TID

## 2025-01-30 NOTE — PROGRESS NOTE ADULT - SUBJECTIVE AND OBJECTIVE BOX
PROGRESS NOTE:     Patient is a 89y old  Male who presents with a chief complaint of AMS (30 Jan 2025 02:13)      SUBJECTIVE / OVERNIGHT EVENTS: no acute events, patient is very confused     ADDITIONAL REVIEW OF SYSTEMS:    MEDICATIONS  (STANDING):  dextrose 5% + lactated ringers. 1000 milliLiter(s) (75 mL/Hr) IV Continuous <Continuous>  enoxaparin Injectable 40 milliGRAM(s) SubCutaneous every 24 hours  piperacillin/tazobactam IVPB.. 3.375 Gram(s) IV Intermittent every 8 hours  remdesivir  IVPB   IV Intermittent     MEDICATIONS  (PRN):      CAPILLARY BLOOD GLUCOSE        I&O's Summary      PHYSICAL EXAM:  Vital Signs Last 24 Hrs  T(C): 37.5 (30 Jan 2025 14:00), Max: 38.7 (30 Jan 2025 13:17)  T(F): 99.5 (30 Jan 2025 14:00), Max: 101.7 (30 Jan 2025 13:17)  HR: 89 (30 Jan 2025 14:00) (72 - 160)  BP: 148/90 (30 Jan 2025 14:00) (99/68 - 178/88)  BP(mean): 79 (29 Jan 2025 19:05) (79 - 93)  RR: 18 (30 Jan 2025 14:00) (18 - 20)  SpO2: 96% (30 Jan 2025 14:00) (96% - 99%)    Parameters below as of 30 Jan 2025 14:00  Patient On (Oxygen Delivery Method): room air    CONSTITUTIONAL: Restless  EYES: PERRL, No conjunctival or scleral injection, non-icteric  RESP: CTA b/l, no W/R/R  CV: RRR, no M/R/G  GI: Soft, NT, ND, no rebound, no guarding  MSK: No clubbing, cyanosis, or edema   SKIN: No rashes or ulcers noted  NEURO: Disoriented, CN II-XII grossly intact    LABS:                        14.1   10.87 )-----------( 228      ( 30 Jan 2025 06:42 )             41.5     01-30    138  |  103  |  15  ----------------------------<  109[H]  3.9   |  23  |  1.02    Ca    8.5      30 Jan 2025 06:42  Phos  3.2     01-30  Mg     2.00     01-30    TPro  6.5  /  Alb  3.4  /  TBili  0.5  /  DBili  x   /  AST  55[H]  /  ALT  27  /  AlkPhos  58  01-30    PT/INR - ( 29 Jan 2025 17:00 )   PT: 12.6 sec;   INR: 1.09 ratio         PTT - ( 29 Jan 2025 17:00 )  PTT:28.4 sec      Urinalysis Basic - ( 30 Jan 2025 06:42 )    Color: x / Appearance: x / SG: x / pH: x  Gluc: 109 mg/dL / Ketone: x  / Bili: x / Urobili: x   Blood: x / Protein: x / Nitrite: x   Leuk Esterase: x / RBC: x / WBC x   Sq Epi: x / Non Sq Epi: x / Bacteria: x        Urinalysis with Rflx Culture (collected 29 Jan 2025 21:30)        RADIOLOGY & ADDITIONAL TESTS:  Results Reviewed:   Imaging Personally Reviewed:  Electrocardiogram Personally Reviewed:    COORDINATION OF CARE:  Care Discussed with Consultants/Other Providers [Y/N]:  Prior or Outpatient Records Reviewed [Y/N]:

## 2025-01-30 NOTE — H&P ADULT - PROBLEM SELECTOR PLAN 8
DVT ppx:  Lovenox    Diet:  Strict NPO pending improvement in mental status    Dispo:  TBD    Code Status:  DNR/DNI

## 2025-01-30 NOTE — H&P ADULT - ASSESSMENT
Mr Eric Mckenna is an 89 year old male with a PMHx of HTN, HLD, dementia, and prostate cancer BIBEMS for AMS and weakness, found to be COVID 19 positive

## 2025-01-30 NOTE — H&P ADULT - PROBLEM/PLAN-4
----- Message from Carlos Manuel Barber MD sent at 2/9/2017  8:38 AM CST -----  Lizeth - please tell Rosanna that her stool for C-diff is negative.   DISPLAY PLAN FREE TEXT

## 2025-01-30 NOTE — PROGRESS NOTE ADULT - PROBLEM SELECTOR PLAN 3
New Afib w/ RVR likely in setting of underlying sepsis, converted to SR   - s/p IV fluids   - s/p IV Lopressor/Diltiazem   - Telemetry monitoring

## 2025-01-30 NOTE — H&P ADULT - PROBLEM SELECTOR PLAN 6
- Hold home tamsulosin 0.4mg PO qHS in setting of AMS  - Hold home finasteride 5mg PO daily in setting of AMS  - Bladder Scans  - Straight cath for residual urine

## 2025-01-30 NOTE — PROGRESS NOTE ADULT - PROBLEM SELECTOR PLAN 2
Likely d/t COVID infection, has underlying dementia   - management of COVID as above   - dysphagia screen   - Maintenance fluids PRN

## 2025-01-30 NOTE — H&P ADULT - CONVERSATION DETAILS
Discussed patient's condition with daughter over the telephone.  Daughter reports that the patient would like CPR, but wants to be DNI.  After clarifying what goes into CPR, and that CPR necessitates intubation if it is going to be successful, she stressed that her father would not want to be intubated, and she decided that the patient should be DNR/DNI.

## 2025-01-30 NOTE — H&P ADULT - PROBLEM SELECTOR PLAN 3
Was given 2.5L crystalloid, IV Tylenol, and 20mg diltiazem with improvement in RVR.  RVR likely in setting of underlying sepsis  - Maintenance Fluids PRN  - IV Tylenol for fever control  - Diltiazem pushes as needed  - Telemetry

## 2025-01-30 NOTE — H&P ADULT - NSHPLABSRESULTS_GEN_ALL_CORE
LABS:                        13.2   9.44  )-----------( 206      ( 29 Jan 2025 17:00 )             39.5     29 Jan 2025 17:00    141    |  107    |  19     ----------------------------<  99     4.5     |  21     |  1.22     Ca    8.9        29 Jan 2025 17:00    TPro  6.3    /  Alb  3.7    /  TBili  0.3    /  DBili  x      /  AST  49     /  ALT  25     /  AlkPhos  55     29 Jan 2025 17:00    PT/INR - ( 29 Jan 2025 17:00 )   PT: 12.6 sec;   INR: 1.09 ratio         PTT - ( 29 Jan 2025 17:00 )  PTT:28.4 sec  CAPILLARY BLOOD GLUCOSE      POCT Blood Glucose.: 134 mg/dL (29 Jan 2025 15:28)    BLOOD CULTURE    RADIOLOGY & ADDITIONAL TESTS:    Imaging Personally Reviewed:  [ ] YES

## 2025-01-30 NOTE — PROVIDER CONTACT NOTE (OTHER) - ASSESSMENT
patient a&Ox0-MARAL due to cognition. Pt appears restless, anxious, unable to calm. Pt pulling at linen

## 2025-01-30 NOTE — H&P ADULT - NSHPPHYSICALEXAM_GEN_ALL_CORE
T(C): 38.1 (01-30-25 @ 01:40), Max: 38.1 (01-30-25 @ 01:40)  HR: 86 (01-30-25 @ 01:40) (67 - 160)  BP: 130/80 (01-30-25 @ 00:52) (99/68 - 139/73)  RR: 18 (01-30-25 @ 00:52) (17 - 20)  SpO2: 98% (01-30-25 @ 00:52) (96% - 99%)    CONSTITUTIONAL: Muttering, acutely ill  EYES: PERRL, No conjunctival or scleral injection, non-icteric  ENMT: Dry MM, dry tongue  RESP: CTA b/l, no W/R/R  CV: RRR, no M/R/G  GI: Soft, NT, ND, no rebound, no guarding  MSK: No clubbing, cyanosis, or edema   SKIN: No rashes or ulcers noted  NEURO: CN II-XII grossly intact  PSYCH: Unable to assess

## 2025-01-30 NOTE — ED ADULT NURSE REASSESSMENT NOTE - NS ED NURSE REASSESS COMMENT FT1
Pt is laying in the stretcher. Pt is on the continuous cardiac monitor. Pt medicated as ordered. Pt is admitted and waiting for a bed. Respirations are equal and unlabored bilaterally. Stretcher is in the lowest position and safety maintained.

## 2025-01-30 NOTE — ED ADULT NURSE REASSESSMENT NOTE - NS ED NURSE REASSESS COMMENT FT1
Pt is resting in the stretcher. Pt medicated as ordered. Pt is on the continuous cardiac monitor. respirations are equal and unlabored bilaterally. Pt is admitted and waiting for a bed. Stretcher is in the lowest position and safety maintained.

## 2025-01-31 LAB
ADD ON TEST-SPECIMEN IN LAB: SIGNIFICANT CHANGE UP
ALBUMIN SERPL ELPH-MCNC: 3.3 G/DL — SIGNIFICANT CHANGE UP (ref 3.3–5)
ALP SERPL-CCNC: 61 U/L — SIGNIFICANT CHANGE UP (ref 40–120)
ALT FLD-CCNC: 30 U/L — SIGNIFICANT CHANGE UP (ref 4–41)
ANION GAP SERPL CALC-SCNC: 13 MMOL/L — SIGNIFICANT CHANGE UP (ref 7–14)
AST SERPL-CCNC: 53 U/L — HIGH (ref 4–40)
BILIRUB SERPL-MCNC: 0.6 MG/DL — SIGNIFICANT CHANGE UP (ref 0.2–1.2)
BUN SERPL-MCNC: 19 MG/DL — SIGNIFICANT CHANGE UP (ref 7–23)
CALCIUM SERPL-MCNC: 8.5 MG/DL — SIGNIFICANT CHANGE UP (ref 8.4–10.5)
CHLORIDE SERPL-SCNC: 105 MMOL/L — SIGNIFICANT CHANGE UP (ref 98–107)
CO2 SERPL-SCNC: 22 MMOL/L — SIGNIFICANT CHANGE UP (ref 22–31)
CREAT SERPL-MCNC: 1.01 MG/DL — SIGNIFICANT CHANGE UP (ref 0.5–1.3)
EGFR: 72 ML/MIN/1.73M2 — SIGNIFICANT CHANGE UP
GLUCOSE SERPL-MCNC: 102 MG/DL — HIGH (ref 70–99)
HCT VFR BLD CALC: 39 % — SIGNIFICANT CHANGE UP (ref 39–50)
HGB BLD-MCNC: 13.8 G/DL — SIGNIFICANT CHANGE UP (ref 13–17)
MAGNESIUM SERPL-MCNC: 2 MG/DL — SIGNIFICANT CHANGE UP (ref 1.6–2.6)
MCHC RBC-ENTMCNC: 31.4 PG — SIGNIFICANT CHANGE UP (ref 27–34)
MCHC RBC-ENTMCNC: 35.4 G/DL — SIGNIFICANT CHANGE UP (ref 32–36)
MCV RBC AUTO: 88.8 FL — SIGNIFICANT CHANGE UP (ref 80–100)
NRBC # BLD AUTO: 0 K/UL — SIGNIFICANT CHANGE UP (ref 0–0)
NRBC # BLD: 0 /100 WBCS — SIGNIFICANT CHANGE UP (ref 0–0)
NRBC # FLD: 0 K/UL — SIGNIFICANT CHANGE UP (ref 0–0)
NRBC BLD-RTO: 0 /100 WBCS — SIGNIFICANT CHANGE UP (ref 0–0)
PHOSPHATE SERPL-MCNC: 3 MG/DL — SIGNIFICANT CHANGE UP (ref 2.5–4.5)
PLATELET # BLD AUTO: 216 K/UL — SIGNIFICANT CHANGE UP (ref 150–400)
POTASSIUM SERPL-MCNC: 3.7 MMOL/L — SIGNIFICANT CHANGE UP (ref 3.5–5.3)
POTASSIUM SERPL-SCNC: 3.7 MMOL/L — SIGNIFICANT CHANGE UP (ref 3.5–5.3)
PROCALCITONIN SERPL-MCNC: 0.19 NG/ML — HIGH (ref 0.02–0.1)
PROT SERPL-MCNC: 6.1 G/DL — SIGNIFICANT CHANGE UP (ref 6–8.3)
RBC # BLD: 4.39 M/UL — SIGNIFICANT CHANGE UP (ref 4.2–5.8)
RBC # FLD: 13.1 % — SIGNIFICANT CHANGE UP (ref 10.3–14.5)
SODIUM SERPL-SCNC: 140 MMOL/L — SIGNIFICANT CHANGE UP (ref 135–145)
TSH SERPL-MCNC: 1.99 UIU/ML — SIGNIFICANT CHANGE UP (ref 0.27–4.2)
WBC # BLD: 9.43 K/UL — SIGNIFICANT CHANGE UP (ref 3.8–10.5)
WBC # FLD AUTO: 9.43 K/UL — SIGNIFICANT CHANGE UP (ref 3.8–10.5)

## 2025-01-31 PROCEDURE — 99233 SBSQ HOSP IP/OBS HIGH 50: CPT

## 2025-01-31 RX ORDER — METOPROLOL SUCCINATE 50 MG/1
5 TABLET, EXTENDED RELEASE ORAL ONCE
Refills: 0 | Status: COMPLETED | OUTPATIENT
Start: 2025-01-31 | End: 2025-01-31

## 2025-01-31 RX ORDER — DIGOXIN 250 UG/1
500 TABLET ORAL ONCE
Refills: 0 | Status: COMPLETED | OUTPATIENT
Start: 2025-01-31 | End: 2025-01-31

## 2025-01-31 RX ORDER — ACETAMINOPHEN 500 MG/5ML
1000 LIQUID (ML) ORAL ONCE
Refills: 0 | Status: COMPLETED | OUTPATIENT
Start: 2025-01-31 | End: 2025-01-31

## 2025-01-31 RX ORDER — METOPROLOL SUCCINATE 50 MG/1
25 TABLET, EXTENDED RELEASE ORAL
Refills: 0 | Status: DISCONTINUED | OUTPATIENT
Start: 2025-01-31 | End: 2025-01-31

## 2025-01-31 RX ORDER — DILTIAZEM HYDROCHLORIDE 240 MG/1
10 TABLET, EXTENDED RELEASE ORAL ONCE
Refills: 0 | Status: COMPLETED | OUTPATIENT
Start: 2025-01-31 | End: 2025-01-31

## 2025-01-31 RX ORDER — METOPROLOL SUCCINATE 50 MG/1
5 TABLET, EXTENDED RELEASE ORAL EVERY 6 HOURS
Refills: 0 | Status: DISCONTINUED | OUTPATIENT
Start: 2025-01-31 | End: 2025-01-31

## 2025-01-31 RX ORDER — DIGOXIN 250 UG/1
250 TABLET ORAL EVERY 8 HOURS
Refills: 0 | Status: COMPLETED | OUTPATIENT
Start: 2025-01-31 | End: 2025-02-01

## 2025-01-31 RX ORDER — SODIUM CHLORIDE 9 G/1000ML
1000 INJECTION, SOLUTION INTRAVENOUS
Refills: 0 | Status: DISCONTINUED | OUTPATIENT
Start: 2025-01-31 | End: 2025-02-02

## 2025-01-31 RX ORDER — SODIUM CHLORIDE 9 G/1000ML
1000 INJECTION, SOLUTION INTRAVENOUS
Refills: 0 | Status: DISCONTINUED | OUTPATIENT
Start: 2025-01-31 | End: 2025-01-31

## 2025-01-31 RX ADMIN — DILTIAZEM HYDROCHLORIDE 10 MILLIGRAM(S): 240 TABLET, EXTENDED RELEASE ORAL at 09:49

## 2025-01-31 RX ADMIN — REMDESIVIR 200 MILLIGRAM(S): 5 INJECTION INTRAVENOUS at 13:43

## 2025-01-31 RX ADMIN — ENOXAPARIN SODIUM 40 MILLIGRAM(S): 100 INJECTION SUBCUTANEOUS at 10:49

## 2025-01-31 RX ADMIN — SODIUM CHLORIDE 75 MILLILITER(S): 9 INJECTION, SOLUTION INTRAVENOUS at 09:50

## 2025-01-31 RX ADMIN — SODIUM CHLORIDE 75 MILLILITER(S): 9 INJECTION, SOLUTION INTRAVENOUS at 22:17

## 2025-01-31 RX ADMIN — Medication 1000 MILLIGRAM(S): at 09:57

## 2025-01-31 RX ADMIN — Medication 400 MILLIGRAM(S): at 17:51

## 2025-01-31 RX ADMIN — METOPROLOL SUCCINATE 5 MILLIGRAM(S): 50 TABLET, EXTENDED RELEASE ORAL at 08:11

## 2025-01-31 RX ADMIN — Medication 25 GRAM(S): at 22:17

## 2025-01-31 RX ADMIN — DIGOXIN 250 MICROGRAM(S): 250 TABLET ORAL at 22:18

## 2025-01-31 RX ADMIN — METOPROLOL SUCCINATE 5 MILLIGRAM(S): 50 TABLET, EXTENDED RELEASE ORAL at 10:40

## 2025-01-31 RX ADMIN — Medication 25 GRAM(S): at 06:33

## 2025-01-31 RX ADMIN — Medication 25 GRAM(S): at 13:42

## 2025-01-31 RX ADMIN — DIGOXIN 500 MICROGRAM(S): 250 TABLET ORAL at 16:50

## 2025-01-31 RX ADMIN — Medication 400 MILLIGRAM(S): at 09:50

## 2025-01-31 RX ADMIN — Medication 1000 MILLIGRAM(S): at 18:09

## 2025-01-31 RX ADMIN — METOPROLOL SUCCINATE 5 MILLIGRAM(S): 50 TABLET, EXTENDED RELEASE ORAL at 14:58

## 2025-01-31 NOTE — PROGRESS NOTE ADULT - PROBLEM SELECTOR PLAN 3
New Afib w/ RVR in setting of underlying sepsis  - IV fluids   - start IV Lopressor 5mg IV q6h   - will consider digoxin load if HR remains elevated   - check TTE   - Telemetry monitoring  - will defer anticoagulation given fall risk and advanced dementia  - cardiology input appreciated

## 2025-01-31 NOTE — PROGRESS NOTE ADULT - PROBLEM SELECTOR PLAN 2
Likely d/t COVID infection, has underlying dementia   - more alert today   - management of COVID as above  - NPO/IVF   - SLP eval pending

## 2025-01-31 NOTE — PROVIDER CONTACT NOTE (OTHER) - ASSESSMENT
Patient A&Ox1, denies chest pain or shortness of breath at this time.  patient's HR has been elevated all day.  Patient's VSS, afebrile

## 2025-01-31 NOTE — CHART NOTE - NSCHARTNOTEFT_GEN_A_CORE
Pt in Afib w/ RVR despite Lopressor 5mg q6hrs.  Discussed w/ Dr. Perez- will begin IV Digoxin load as per cardiology recommendations.    Carlos Rivas PA-C  pager [phone] Pt in Afib w/ RVR despite Lopressor 5mg q6hrs.  Discussed w/ Dr. Perez- will begin IV Digoxin load as per cardiology recommendations.    Addendum:  Notified by RN that pt w/ HR in 160-170 1 hours s/p Dig 500mcg, remainder of VSS. Discussed w/ Dr. Kinney, c/w Digoxin load as ordered.  Dr. Perez made aware.      SAM KeeneC  pager [phone] Pt in Afib w/ RVR despite Lopressor 5mg q6hrs.  Discussed w/ Dr. Perez- will begin IV Digoxin load as per cardiology recommendations.    Addendum:  Notified by RN that pt w/ HR in 160-170 1 hours s/p Dig 500mcg w/ rectal temp 102.0F.   Discussed w/ Dr. Kinney, c/w Digoxin load as ordered.   Will administer IV tylenol.   Dr. Perez made aware.      SAM KeeneC  pager [phone] Pt in Afib w/ RVR despite Lopressor 5mg q6hrs.  Discussed w/ Dr. Perez- will begin IV Digoxin load as per cardiology recommendations.    Addendum:  Notified by RN that pt w/ HR in 160s 1 hours s/p Dig 500mcg w/ rectal temp 102.2F.   Discussed w/ Dr. Kinney, c/w Digoxin load as ordered.   Will administer IV tylenol.   Dr. Perez made aware.      Carlos Rivas PA-C  pager [phone]

## 2025-01-31 NOTE — CONSULT NOTE ADULT - SUBJECTIVE AND OBJECTIVE BOX
Cardiovascular Disease Initial Evaluation  DATE OF SERVICE: 01-31-25 @ 11:05    CHIEF COMPLAINT: Lethargic    HISTORY OF PRESENT ILLNESS:    This is an 89 year old man with HTN, hyperlipidemia, and advanced dementia who presented to [facility] on 1/30/2025 with AMS and weakness.   The patient fell at home the day prior to admission, and was found on the floor next to his bed with scrapes on his knees.  He was unable to stand on his own.  At that point he was brought to the ED.  Notably, his other daughter was caring for him at the time and she tested positive for COVID 19.    The patient cannot provide a history due to dementia.          Allergies  No Known Allergies      MEDICATIONS:  enoxaparin Injectable 40 milliGRAM(s) SubCutaneous every 24 hours  metoprolol tartrate 25 milliGRAM(s) Oral two times a day    piperacillin/tazobactam IVPB.. 3.375 Gram(s) IV Intermittent every 8 hours  remdesivir  IVPB   IV Intermittent   remdesivir  IVPB 100 milliGRAM(s) IV Intermittent every 24 hours            dextrose 5% + lactated ringers. 1000 milliLiter(s) IV Continuous <Continuous>      PAST MEDICAL & SURGICAL HISTORY:  HTN (hypertension)      HLD (hyperlipidemia)      Dementia      Depression      Personal history of prostate cancer      History of appendectomy          FAMILY HISTORY:  No pertinent family history in first degree relatives        SOCIAL HISTORY:    The patient is a nonsmoker       REVIEW OF SYSTEMS:  See HPI, otherwise complete 14 point review of systems negative        PHYSICAL EXAM:  T(C): 37.2 (01-31-25 @ 07:59), Max: 38.7 (01-30-25 @ 13:17)  HR: 150 (01-31-25 @ 07:59) (85 - 150)  BP: 144/88 (01-31-25 @ 07:59) (131/98 - 162/93)  RR: 18 (01-31-25 @ 07:59) (18 - 20)  SpO2: 96% (01-31-25 @ 07:59) (93% - 98%)  Wt(kg): --  I&O's Summary    30 Jan 2025 07:01  -  31 Jan 2025 07:00  --------------------------------------------------------  IN: 0 mL / OUT: 600 mL / NET: -600 mL        Appearance: No Acute Distress; resting comfortably  HEENT:  Normal oral mucosa, PERRL	  Cardiovascular: Normal S1 S2, No JVD, No murmurs/rubs/gallops  Respiratory: Normal respiratory effort; Lungs clear to auscultation bilaterally  Gastrointestinal:  Soft, Non-tender, + BS	  Skin: No rashes, No ecchymoses, No cyanosis	  Neurologic: Non-focal; no weakness  Extremities: No clubbing, cyanosis or edema  Vascular: Peripheral pulses palpable 2+ bilaterally  Psychiatry: A & O x 0, Mood & affect appropriate    Laboratory Data:	 	    CBC Full  -  ( 31 Jan 2025 06:34 )  WBC Count : 9.43 K/uL  Hemoglobin : 13.8 g/dL  Hematocrit : 39.0 %  Platelet Count - Automated : 216 K/uL  Mean Cell Volume : 88.8 fL  Mean Cell Hemoglobin : 31.4 pg  Mean Cell Hemoglobin Concentration : 35.4 g/dL  Auto Neutrophil # : x  Auto Lymphocyte # : x  Auto Monocyte # : x  Auto Eosinophil # : x  Auto Basophil # : x  Auto Neutrophil % : x  Auto Lymphocyte % : x  Auto Monocyte % : x  Auto Eosinophil % : x  Auto Basophil % : x    01-31    140  |  105  |  19  ----------------------------<  102[H]  3.7   |  22  |  1.01  01-30    138  |  103  |  15  ----------------------------<  109[H]  3.9   |  23  |  1.02    Ca    8.5      31 Jan 2025 06:34  Ca    8.5      30 Jan 2025 06:42  Phos  3.0     01-31  Phos  3.2     01-30  Mg     2.00     01-31  Mg     2.00     01-30    TPro  6.1  /  Alb  3.3  /  TBili  0.6  /  DBili  x   /  AST  53[H]  /  ALT  30  /  AlkPhos  61  01-31  TPro  6.5  /  Alb  3.4  /  TBili  0.5  /  DBili  x   /  AST  55[H]  /  ALT  27  /  AlkPhos  58  01-30        Interpretation of Telemetry: a-fib 130s	    ECG:  	A-fib with RVR      Assessment: 89 year old man with HTN, hyperlipidemia, and advanced dementia presents with a-fib with RVR and COVID-19.     Plan of Care:    #A-fib with RVR-  Driven by COVID-19 and recurrent fevers.  Change Lopressor to 5 mg IV q 6 hours.  If rates are persistently elevated despite Lopressor, consider IV digoxin load.   Given debility, fall risk, and advanced dementia, the risks of anticoagulation outweigh the benefits.    #COVID-19-  Remdesivir as per the primary team.      Plan of care discussed with the primary team attending physician.    High and complex medical decision making in this patient with comorbidities.       76 minutes spent on total encounter; more than 50% of the visit was spent counseling and/or coordinating care by the attending physician.   	  Mello Kinney MD [facility]  Cardiovascular Diseases  [phone]

## 2025-01-31 NOTE — PROGRESS NOTE ADULT - SUBJECTIVE AND OBJECTIVE BOX
PROGRESS NOTE:     Patient is a 89y old  Male who presents with a chief complaint of AMS (31 Jan 2025 11:05)      SUBJECTIVE / OVERNIGHT EVENTS: pt back in Afib w/ RVR.     ADDITIONAL REVIEW OF SYSTEMS:    MEDICATIONS  (STANDING):  dextrose 5% + lactated ringers. 1000 milliLiter(s) (75 mL/Hr) IV Continuous <Continuous>  enoxaparin Injectable 40 milliGRAM(s) SubCutaneous every 24 hours  metoprolol tartrate 25 milliGRAM(s) Oral two times a day  piperacillin/tazobactam IVPB.. 3.375 Gram(s) IV Intermittent every 8 hours  remdesivir  IVPB   IV Intermittent   remdesivir  IVPB 100 milliGRAM(s) IV Intermittent every 24 hours    MEDICATIONS  (PRN):      CAPILLARY BLOOD GLUCOSE        I&O's Summary    30 Jan 2025 07:01  -  31 Jan 2025 07:00  --------------------------------------------------------  IN: 0 mL / OUT: 600 mL / NET: -600 mL        PHYSICAL EXAM:  Vital Signs Last 24 Hrs  T(C): 37.7 (31 Jan 2025 11:04), Max: 38.7 (30 Jan 2025 13:17)  T(F): 99.8 (31 Jan 2025 11:04), Max: 101.7 (30 Jan 2025 13:17)  HR: 157 (31 Jan 2025 11:04) (85 - 160)  BP: 140/89 (31 Jan 2025 11:04) (110/53 - 162/93)  BP(mean): --  RR: 16 (31 Jan 2025 11:04) (16 - 20)  SpO2: 100% (31 Jan 2025 11:04) (93% - 100%)    Parameters below as of 31 Jan 2025 11:04  Patient On (Oxygen Delivery Method): room air    CONSTITUTIONAL: NAD  EYES: PERRL, No conjunctival or scleral injection, non-icteric  RESP: CTA b/l, no W/R/R  CV: Irregular rate and rhythm, no M/R/G  GI: Soft, NT, ND, no rebound, no guarding  MSK: No clubbing, cyanosis, or edema   SKIN: No rashes or ulcers noted  NEURO: Disoriented, CN II-XII grossly intact    LABS:                        13.8   9.43  )-----------( 216      ( 31 Jan 2025 06:34 )             39.0     01-31    140  |  105  |  19  ----------------------------<  102[H]  3.7   |  22  |  1.01    Ca    8.5      31 Jan 2025 06:34  Phos  3.0     01-31  Mg     2.00     01-31    TPro  6.1  /  Alb  3.3  /  TBili  0.6  /  DBili  x   /  AST  53[H]  /  ALT  30  /  AlkPhos  61  01-31    PT/INR - ( 29 Jan 2025 17:00 )   PT: 12.6 sec;   INR: 1.09 ratio         PTT - ( 29 Jan 2025 17:00 )  PTT:28.4 sec      Urinalysis Basic - ( 31 Jan 2025 06:34 )    Color: x / Appearance: x / SG: x / pH: x  Gluc: 102 mg/dL / Ketone: x  / Bili: x / Urobili: x   Blood: x / Protein: x / Nitrite: x   Leuk Esterase: x / RBC: x / WBC x   Sq Epi: x / Non Sq Epi: x / Bacteria: x        Urinalysis with Rflx Culture (collected 29 Jan 2025 21:30)    Culture - Blood (collected 29 Jan 2025 17:10)  Source: .Blood BLOOD  Preliminary Report (30 Jan 2025 21:01):    No growth at 24 hours    Culture - Blood (collected 29 Jan 2025 17:00)  Source: .Blood BLOOD  Preliminary Report (30 Jan 2025 21:01):    No growth at 24 hours        RADIOLOGY & ADDITIONAL TESTS:  Results Reviewed:   Imaging Personally Reviewed:  Electrocardiogram Personally Reviewed:    COORDINATION OF CARE:  Care Discussed with Consultants/Other Providers [Y/N]:  Prior or Outpatient Records Reviewed [Y/N]:   Walk in

## 2025-02-01 LAB
ANION GAP SERPL CALC-SCNC: 12 MMOL/L — SIGNIFICANT CHANGE UP (ref 7–14)
BUN SERPL-MCNC: 21 MG/DL — SIGNIFICANT CHANGE UP (ref 7–23)
CALCIUM SERPL-MCNC: 8.2 MG/DL — LOW (ref 8.4–10.5)
CHLORIDE SERPL-SCNC: 105 MMOL/L — SIGNIFICANT CHANGE UP (ref 98–107)
CO2 SERPL-SCNC: 22 MMOL/L — SIGNIFICANT CHANGE UP (ref 22–31)
CREAT SERPL-MCNC: 0.97 MG/DL — SIGNIFICANT CHANGE UP (ref 0.5–1.3)
DIGOXIN SERPL-MCNC: 1.3 NG/ML — SIGNIFICANT CHANGE UP (ref 0.8–2)
EGFR: 75 ML/MIN/1.73M2 — SIGNIFICANT CHANGE UP
GLUCOSE SERPL-MCNC: 117 MG/DL — HIGH (ref 70–99)
HCT VFR BLD CALC: 38.1 % — LOW (ref 39–50)
HGB BLD-MCNC: 13.3 G/DL — SIGNIFICANT CHANGE UP (ref 13–17)
MAGNESIUM SERPL-MCNC: 1.9 MG/DL — SIGNIFICANT CHANGE UP (ref 1.6–2.6)
MCHC RBC-ENTMCNC: 31.1 PG — SIGNIFICANT CHANGE UP (ref 27–34)
MCHC RBC-ENTMCNC: 34.9 G/DL — SIGNIFICANT CHANGE UP (ref 32–36)
MCV RBC AUTO: 89 FL — SIGNIFICANT CHANGE UP (ref 80–100)
NRBC # BLD AUTO: 0 K/UL — SIGNIFICANT CHANGE UP (ref 0–0)
NRBC # BLD: 0 /100 WBCS — SIGNIFICANT CHANGE UP (ref 0–0)
NRBC # FLD: 0 K/UL — SIGNIFICANT CHANGE UP (ref 0–0)
NRBC BLD-RTO: 0 /100 WBCS — SIGNIFICANT CHANGE UP (ref 0–0)
PHOSPHATE SERPL-MCNC: 2.6 MG/DL — SIGNIFICANT CHANGE UP (ref 2.5–4.5)
PLATELET # BLD AUTO: 237 K/UL — SIGNIFICANT CHANGE UP (ref 150–400)
POTASSIUM SERPL-MCNC: 3.7 MMOL/L — SIGNIFICANT CHANGE UP (ref 3.5–5.3)
POTASSIUM SERPL-SCNC: 3.7 MMOL/L — SIGNIFICANT CHANGE UP (ref 3.5–5.3)
RBC # BLD: 4.28 M/UL — SIGNIFICANT CHANGE UP (ref 4.2–5.8)
RBC # FLD: 13.1 % — SIGNIFICANT CHANGE UP (ref 10.3–14.5)
SODIUM SERPL-SCNC: 139 MMOL/L — SIGNIFICANT CHANGE UP (ref 135–145)
WBC # BLD: 11.08 K/UL — HIGH (ref 3.8–10.5)
WBC # FLD AUTO: 11.08 K/UL — HIGH (ref 3.8–10.5)

## 2025-02-01 PROCEDURE — 99233 SBSQ HOSP IP/OBS HIGH 50: CPT

## 2025-02-01 RX ORDER — DIGOXIN 250 UG/1
125 TABLET ORAL DAILY
Refills: 0 | Status: DISCONTINUED | OUTPATIENT
Start: 2025-02-02 | End: 2025-02-02

## 2025-02-01 RX ORDER — METOPROLOL SUCCINATE 50 MG/1
5 TABLET, EXTENDED RELEASE ORAL EVERY 6 HOURS
Refills: 0 | Status: DISCONTINUED | OUTPATIENT
Start: 2025-02-01 | End: 2025-02-02

## 2025-02-01 RX ADMIN — METOPROLOL SUCCINATE 5 MILLIGRAM(S): 50 TABLET, EXTENDED RELEASE ORAL at 12:43

## 2025-02-01 RX ADMIN — Medication 25 GRAM(S): at 06:19

## 2025-02-01 RX ADMIN — METOPROLOL SUCCINATE 5 MILLIGRAM(S): 50 TABLET, EXTENDED RELEASE ORAL at 18:25

## 2025-02-01 RX ADMIN — Medication 25 GRAM(S): at 13:42

## 2025-02-01 RX ADMIN — METOPROLOL SUCCINATE 5 MILLIGRAM(S): 50 TABLET, EXTENDED RELEASE ORAL at 23:01

## 2025-02-01 RX ADMIN — Medication 25 GRAM(S): at 22:15

## 2025-02-01 RX ADMIN — REMDESIVIR 200 MILLIGRAM(S): 5 INJECTION INTRAVENOUS at 12:37

## 2025-02-01 RX ADMIN — DIGOXIN 250 MICROGRAM(S): 250 TABLET ORAL at 06:19

## 2025-02-01 RX ADMIN — ENOXAPARIN SODIUM 40 MILLIGRAM(S): 100 INJECTION SUBCUTANEOUS at 10:25

## 2025-02-01 NOTE — PROGRESS NOTE ADULT - SUBJECTIVE AND OBJECTIVE BOX
PROGRESS NOTE:     Patient is a 89y old  Male who presents with a chief complaint of AMS (01 Feb 2025 07:11)      SUBJECTIVE / OVERNIGHT EVENTS: elevated HR.     ADDITIONAL REVIEW OF SYSTEMS:    MEDICATIONS  (STANDING):  dextrose 5% + lactated ringers. 1000 milliLiter(s) (75 mL/Hr) IV Continuous <Continuous>  enoxaparin Injectable 40 milliGRAM(s) SubCutaneous every 24 hours  metoprolol tartrate Injectable 5 milliGRAM(s) IV Push every 6 hours  piperacillin/tazobactam IVPB.. 3.375 Gram(s) IV Intermittent every 8 hours  remdesivir  IVPB   IV Intermittent   remdesivir  IVPB 100 milliGRAM(s) IV Intermittent every 24 hours    MEDICATIONS  (PRN):      CAPILLARY BLOOD GLUCOSE        I&O's Summary    31 Jan 2025 07:01  -  01 Feb 2025 07:00  --------------------------------------------------------  IN: 900 mL / OUT: 2100 mL / NET: -1200 mL        PHYSICAL EXAM:  Vital Signs Last 24 Hrs  T(C): 37.4 (01 Feb 2025 05:45), Max: 39 (31 Jan 2025 17:45)  T(F): 99.4 (01 Feb 2025 05:45), Max: 102.2 (31 Jan 2025 17:45)  HR: 121 (01 Feb 2025 05:45) (121 - 170)  BP: 132/92 (01 Feb 2025 05:45) (110/53 - 144/90)  BP(mean): --  RR: 18 (01 Feb 2025 05:45) (16 - 20)  SpO2: 96% (01 Feb 2025 05:45) (96% - 100%)    Parameters below as of 01 Feb 2025 05:45  Patient On (Oxygen Delivery Method): room air      CONSTITUTIONAL: NAD  EYES: PERRL, No conjunctival or scleral injection, non-icteric  RESP: CTA b/l, no W/R/R  CV: Irregular rate and rhythm, no M/R/G  GI: Soft, NT, ND, no rebound, no guarding  MSK: No clubbing, cyanosis, or edema   SKIN: No rashes or ulcers noted  NEURO: Disoriented, CN II-XII grossly intact      LABS:                        13.3   11.08 )-----------( 237      ( 01 Feb 2025 04:29 )             38.1     02-01    139  |  105  |  21  ----------------------------<  117[H]  3.7   |  22  |  0.97    Ca    8.2[L]      01 Feb 2025 04:29  Phos  2.6     02-01  Mg     1.90     02-01    TPro  6.1  /  Alb  3.3  /  TBili  0.6  /  DBili  x   /  AST  53[H]  /  ALT  30  /  AlkPhos  61  01-31          Urinalysis Basic - ( 01 Feb 2025 04:29 )    Color: x / Appearance: x / SG: x / pH: x  Gluc: 117 mg/dL / Ketone: x  / Bili: x / Urobili: x   Blood: x / Protein: x / Nitrite: x   Leuk Esterase: x / RBC: x / WBC x   Sq Epi: x / Non Sq Epi: x / Bacteria: x        Urinalysis with Rflx Culture (collected 29 Jan 2025 21:30)    Culture - Blood (collected 29 Jan 2025 17:10)  Source: .Blood BLOOD  Preliminary Report (31 Jan 2025 21:01):    No growth at 48 Hours    Culture - Blood (collected 29 Jan 2025 17:00)  Source: .Blood BLOOD  Preliminary Report (31 Jan 2025 21:01):    No growth at 48 Hours        RADIOLOGY & ADDITIONAL TESTS:  Results Reviewed:   Imaging Personally Reviewed:  Electrocardiogram Personally Reviewed:    COORDINATION OF CARE:  Care Discussed with Consultants/Other Providers [Y/N]:  Prior or Outpatient Records Reviewed [Y/N]:

## 2025-02-01 NOTE — PROGRESS NOTE ADULT - PROBLEM SELECTOR PLAN 2
Likely d/t COVID infection, has underlying dementia   - more alert  - management of COVID as above  - NPO/IVF   - SLP eval pending

## 2025-02-01 NOTE — SWALLOW BEDSIDE ASSESSMENT ADULT - SWALLOW EVAL: RECOMMENDED DIET
oral nutrition/hydration/medication is contraindicated. Consider short term means of non oral nutrition/hydration/medication and GOC discussion with patient's caregivers re: nutritional plan of care. Maintain aspiration precautions and strict oral care.

## 2025-02-01 NOTE — SWALLOW BEDSIDE ASSESSMENT ADULT - COMMENTS
Consult received and chart reviewed. Patient seen at bedside this AM for initial assessment of swallow function at which time he was alert, oriented x1, and cooperative. RN at bedside reports patient with cognitive deficits and confusion at baseline and attempted to provide translation to [place] however patient with inconsistent ability to follow commands, despite cues, and presented with nonsensical verbalizations. Patient did not display any non verbal indicators suggestive of pain. Patient with wet congestive coughing at baseline.     Per charting, the patient is a "89 year old male with a PMHx of HTN, HLD, dementia, and prostate cancer BIBEMS for AMS and weakness, found to be COVID 19 positive, also w/ new onset Afib w/ RVR. "    WBC elevated  CXR 1/29/25 revealed "Clear lungs."  CT Head 1/29/2 revealed "No acute intracranial hemorrhage, mass effect, or midline shift."    Discussed results and recommendations with the patient, RN, and call out to ACP.

## 2025-02-01 NOTE — SWALLOW BEDSIDE ASSESSMENT ADULT - SWALLOW EVAL: DIAGNOSIS
SLP presented 1/2 tsp of pureed to patient which patient was orally receptive to evidence by opening of his mouth; however patient presented with a severe oral dysphagia marked by absent manipulation as bolus remained in anterior portion of oral cavity and patient did not swallow despite max cues likely due to decreased awareness/cognitive deficits. SLP safely removed bolus from oral cavity via yankauer suctioning and patient was left in NAD. Patient also with wet congestive upper airway sounds/coughing suggestive of poor secretion management and is therefore at a heightened aspiration risk. Recommend oral nutrition/hydration/medication is contraindicated. Consider short term means of non oral nutrition/hydration/medication and GOC discussion with patient's caregivers re: nutritional plan of care. Maintain aspiration precautions and strict oral care.

## 2025-02-01 NOTE — PROGRESS NOTE ADULT - PROBLEM SELECTOR PLAN 3
New Afib w/ RVR in setting of underlying sepsis  - IV fluids   - started IV Lopressor 5mg IV q6h   - s/p digoxin load, start digoxin 125mcg daily   - check TTE   - Telemetry monitoring  - defer anticoagulation given fall risk and advanced dementia  - cardiology input appreciated

## 2025-02-01 NOTE — PROGRESS NOTE ADULT - SUBJECTIVE AND OBJECTIVE BOX
Cardiovascular Disease Progress Note  DATE OF SERVICE: 02-01-25 @ 07:11    Overnight events: Fevers noted overnight.    The patient is in no distress.     Objective Findings:  T(C): 37.4 (02-01-25 @ 05:45), Max: 39 (01-31-25 @ 17:45)  HR: 121 (02-01-25 @ 05:45) (121 - 170)  BP: 132/92 (02-01-25 @ 05:45) (110/53 - 144/90)  RR: 18 (02-01-25 @ 05:45) (16 - 20)  SpO2: 96% (02-01-25 @ 05:45) (96% - 100%)  Wt(kg): --  Daily     Daily       Physical Exam:  Gen: NAD; Patient resting comfortably  HEENT:   Normocephalic/ atraumatic  CV: IIR, normal S1 + S2, no m/r/g  Lungs:  Normal respiratory effort; clear to auscultation bilaterally  Abd: soft, non-tender; bowel sounds present  Ext: No edema; warm and well perfused    Telemetry: a-fib 140s    Laboratory Data:                        13.3   11.08 )-----------( 237      ( 01 Feb 2025 04:29 )             38.1     02-01    139  |  105  |  21  ----------------------------<  117[H]  3.7   |  22  |  0.97    Ca    8.2[L]      01 Feb 2025 04:29  Phos  2.6     02-01  Mg     1.90     02-01    TPro  6.1  /  Alb  3.3  /  TBili  0.6  /  DBili  x   /  AST  53[H]  /  ALT  30  /  AlkPhos  61  01-31              Inpatient Medications:  MEDICATIONS  (STANDING):  dextrose 5% + lactated ringers. 1000 milliLiter(s) (75 mL/Hr) IV Continuous <Continuous>  enoxaparin Injectable 40 milliGRAM(s) SubCutaneous every 24 hours  piperacillin/tazobactam IVPB.. 3.375 Gram(s) IV Intermittent every 8 hours  remdesivir  IVPB   IV Intermittent   remdesivir  IVPB 100 milliGRAM(s) IV Intermittent every 24 hours      Assessment: 89 year old man with HTN, hyperlipidemia, and advanced dementia presents with a-fib with RVR and COVID-19.     Plan of Care:    #A-fib with RVR-  Driven by COVID-19 and recurrent fevers- last fever 102F yesterday evening.  Rates are persistently elevated despite digoxin load.  Change standing digoxin to  mcg daily.  Start Lopressor to 5 mg IV q 6 hours standing.  Given debility, fall risk, and advanced dementia, the risks of anticoagulation outweigh the benefits.    #COVID-19-  Remdesivir as per the primary team.      #ACP (advance care planning)-  CPT 95620  Advanced care planning was addressed.  Conservative cardiac management given advanced dementia.     High and complex medical decision making in this patient with comorbidities.         Over 55 minutes spent on total encounter; more than 50% of the visit was spent counseling and/or coordinating care by the attending physician.      Mello Kinney MD [facility]  Cardiovascular Disease  [phone]

## 2025-02-01 NOTE — SWALLOW BEDSIDE ASSESSMENT ADULT - ASR SWALLOW RECOMMEND DIAG
objective testing not indicated given severity of oral phase, reduced cognition, and poor secretion management; will continue to follow at bedside at this time

## 2025-02-02 DIAGNOSIS — R13.10 DYSPHAGIA, UNSPECIFIED: ICD-10-CM

## 2025-02-02 LAB
ANION GAP SERPL CALC-SCNC: 13 MMOL/L — SIGNIFICANT CHANGE UP (ref 7–14)
BUN SERPL-MCNC: 26 MG/DL — HIGH (ref 7–23)
CALCIUM SERPL-MCNC: 8.5 MG/DL — SIGNIFICANT CHANGE UP (ref 8.4–10.5)
CHLORIDE SERPL-SCNC: 107 MMOL/L — SIGNIFICANT CHANGE UP (ref 98–107)
CO2 SERPL-SCNC: 21 MMOL/L — LOW (ref 22–31)
CREAT SERPL-MCNC: 0.98 MG/DL — SIGNIFICANT CHANGE UP (ref 0.5–1.3)
EGFR: 74 ML/MIN/1.73M2 — SIGNIFICANT CHANGE UP
GLUCOSE SERPL-MCNC: 87 MG/DL — SIGNIFICANT CHANGE UP (ref 70–99)
HCT VFR BLD CALC: 40.7 % — SIGNIFICANT CHANGE UP (ref 39–50)
HGB BLD-MCNC: 14.2 G/DL — SIGNIFICANT CHANGE UP (ref 13–17)
MAGNESIUM SERPL-MCNC: 2.1 MG/DL — SIGNIFICANT CHANGE UP (ref 1.6–2.6)
MCHC RBC-ENTMCNC: 31.8 PG — SIGNIFICANT CHANGE UP (ref 27–34)
MCHC RBC-ENTMCNC: 34.9 G/DL — SIGNIFICANT CHANGE UP (ref 32–36)
MCV RBC AUTO: 91.1 FL — SIGNIFICANT CHANGE UP (ref 80–100)
NRBC # BLD AUTO: 0 K/UL — SIGNIFICANT CHANGE UP (ref 0–0)
NRBC # BLD: 0 /100 WBCS — SIGNIFICANT CHANGE UP (ref 0–0)
NRBC # FLD: 0 K/UL — SIGNIFICANT CHANGE UP (ref 0–0)
NRBC BLD-RTO: 0 /100 WBCS — SIGNIFICANT CHANGE UP (ref 0–0)
PHOSPHATE SERPL-MCNC: 3 MG/DL — SIGNIFICANT CHANGE UP (ref 2.5–4.5)
PLATELET # BLD AUTO: 233 K/UL — SIGNIFICANT CHANGE UP (ref 150–400)
POTASSIUM SERPL-MCNC: 3.9 MMOL/L — SIGNIFICANT CHANGE UP (ref 3.5–5.3)
POTASSIUM SERPL-SCNC: 3.9 MMOL/L — SIGNIFICANT CHANGE UP (ref 3.5–5.3)
RBC # BLD: 4.47 M/UL — SIGNIFICANT CHANGE UP (ref 4.2–5.8)
RBC # FLD: 13 % — SIGNIFICANT CHANGE UP (ref 10.3–14.5)
SODIUM SERPL-SCNC: 141 MMOL/L — SIGNIFICANT CHANGE UP (ref 135–145)
WBC # BLD: 11.4 K/UL — HIGH (ref 3.8–10.5)
WBC # FLD AUTO: 11.4 K/UL — HIGH (ref 3.8–10.5)

## 2025-02-02 PROCEDURE — 93010 ELECTROCARDIOGRAM REPORT: CPT

## 2025-02-02 PROCEDURE — 99233 SBSQ HOSP IP/OBS HIGH 50: CPT

## 2025-02-02 RX ORDER — METOPROLOL SUCCINATE 50 MG/1
5 TABLET, EXTENDED RELEASE ORAL EVERY 6 HOURS
Refills: 0 | Status: DISCONTINUED | OUTPATIENT
Start: 2025-02-02 | End: 2025-02-04

## 2025-02-02 RX ORDER — METOPROLOL SUCCINATE 50 MG/1
5 TABLET, EXTENDED RELEASE ORAL ONCE
Refills: 0 | Status: COMPLETED | OUTPATIENT
Start: 2025-02-02 | End: 2025-02-02

## 2025-02-02 RX ORDER — HALOPERIDOL 10 MG/1
2.5 TABLET ORAL ONCE
Refills: 0 | Status: COMPLETED | OUTPATIENT
Start: 2025-02-02 | End: 2025-02-02

## 2025-02-02 RX ADMIN — REMDESIVIR 200 MILLIGRAM(S): 5 INJECTION INTRAVENOUS at 11:55

## 2025-02-02 RX ADMIN — METOPROLOL SUCCINATE 5 MILLIGRAM(S): 50 TABLET, EXTENDED RELEASE ORAL at 23:56

## 2025-02-02 RX ADMIN — ENOXAPARIN SODIUM 40 MILLIGRAM(S): 100 INJECTION SUBCUTANEOUS at 11:55

## 2025-02-02 RX ADMIN — METOPROLOL SUCCINATE 5 MILLIGRAM(S): 50 TABLET, EXTENDED RELEASE ORAL at 19:27

## 2025-02-02 RX ADMIN — HALOPERIDOL 2.5 MILLIGRAM(S): 10 TABLET ORAL at 03:18

## 2025-02-02 RX ADMIN — METOPROLOL SUCCINATE 5 MILLIGRAM(S): 50 TABLET, EXTENDED RELEASE ORAL at 08:48

## 2025-02-02 RX ADMIN — Medication 25 GRAM(S): at 06:18

## 2025-02-02 NOTE — PROGRESS NOTE ADULT - PROBLEM SELECTOR PLAN 2
Likely d/t COVID infection, has underlying dementia   - more alert  - management of COVID as above  - NPO/IVF   - failed speech and swallow eval Likely d/t COVID infection, has underlying dementia   - more alert  - management of COVID as above

## 2025-02-02 NOTE — PROGRESS NOTE ADULT - SUBJECTIVE AND OBJECTIVE BOX
PROGRESS NOTE:     Patient is a 89y old  Male who presents with a chief complaint of AMS (01 Feb 2025 09:07)      SUBJECTIVE / OVERNIGHT EVENTS: no acute events.     ADDITIONAL REVIEW OF SYSTEMS:    MEDICATIONS  (STANDING):  dextrose 5% + lactated ringers. 1000 milliLiter(s) (75 mL/Hr) IV Continuous <Continuous>  digoxin  Injectable 125 MICROGram(s) IV Push daily  enoxaparin Injectable 40 milliGRAM(s) SubCutaneous every 24 hours  metoprolol tartrate Injectable 5 milliGRAM(s) IV Push every 6 hours  remdesivir  IVPB   IV Intermittent   remdesivir  IVPB 100 milliGRAM(s) IV Intermittent every 24 hours    MEDICATIONS  (PRN):      CAPILLARY BLOOD GLUCOSE        I&O's Summary    01 Feb 2025 07:01  -  02 Feb 2025 07:00  --------------------------------------------------------  IN: 0 mL / OUT: 0 mL / NET: 0 mL        PHYSICAL EXAM:  Vital Signs Last 24 Hrs  T(C): 37.3 (02 Feb 2025 08:00), Max: 37.4 (01 Feb 2025 22:20)  T(F): 99.1 (02 Feb 2025 08:00), Max: 99.4 (01 Feb 2025 22:20)  HR: 70 (02 Feb 2025 09:10) (62 - 89)  BP: 110/60 (02 Feb 2025 09:10) (95/66 - 158/92)  BP(mean): --  RR: 18 (02 Feb 2025 08:00) (18 - 18)  SpO2: 98% (02 Feb 2025 08:00) (98% - 99%)    Parameters below as of 02 Feb 2025 08:00  Patient On (Oxygen Delivery Method): room air      CONSTITUTIONAL: NAD  EYES: PERRL, No conjunctival or scleral injection, non-icteric  RESP: CTA b/l, no W/R/R  CV: Irregular rate and rhythm, no M/R/G  GI: Soft, NT, ND, no rebound, no guarding  MSK: No clubbing, cyanosis, or edema   SKIN: No rashes or ulcers noted  NEURO: Disoriented, CN II-XII grossly intact    LABS:                        14.2   11.40 )-----------( 233      ( 02 Feb 2025 06:14 )             40.7     02-02    141  |  107  |  26[H]  ----------------------------<  87  3.9   |  21[L]  |  0.98    Ca    8.5      02 Feb 2025 06:14  Phos  3.0     02-02  Mg     2.10     02-02            Urinalysis Basic - ( 02 Feb 2025 06:14 )    Color: x / Appearance: x / SG: x / pH: x  Gluc: 87 mg/dL / Ketone: x  / Bili: x / Urobili: x   Blood: x / Protein: x / Nitrite: x   Leuk Esterase: x / RBC: x / WBC x   Sq Epi: x / Non Sq Epi: x / Bacteria: x          RADIOLOGY & ADDITIONAL TESTS:  Results Reviewed:   Imaging Personally Reviewed:  Electrocardiogram Personally Reviewed:    COORDINATION OF CARE:  Care Discussed with Consultants/Other Providers [Y/N]:  Prior or Outpatient Records Reviewed [Y/N]:

## 2025-02-02 NOTE — PROVIDER CONTACT NOTE (OTHER) - ASSESSMENT
Patient A&Ox0, in no acute distress.  No s/s of chest pain or shortness of breath noted.  patient agitated and attempting to hit staff whenever he's touched. Patient trying to remove tele leads

## 2025-02-02 NOTE — PROGRESS NOTE ADULT - PROBLEM SELECTOR PLAN 3
New Afib w/ RVR in setting of underlying sepsis  - IV fluids   - started IV Lopressor 5mg IV q6h   - s/p digoxin load, c/w digoxin 125mcg daily   - check TTE   - Telemetry monitoring  - defer anticoagulation given fall risk and advanced dementia  - cardiology input appreciated New Afib w/ RVR in setting of underlying sepsis, now w/ sinus bradycardia   - s/p IV fluids   - started IV Lopressor 5mg IV q6h   - s/p digoxin load, hold digoxin   - check TTE   - Telemetry monitoring  - defer anticoagulation given fall risk and advanced dementia  - cardiology input appreciated

## 2025-02-02 NOTE — PROGRESS NOTE ADULT - PROBLEM SELECTOR PLAN 8
DVT ppx:  Lovenox    Dispo:  TBD    Code Status:  DNR/DNI - in setting of dementia   - failed speech and swallow  - spoke w/ daughter, not interested in PEG and would like to initiate pleasure feeds. Daughter understands risk of aspiration.   - start pureed diet w/ moderate thickened liquids   - aspiration precautions

## 2025-02-02 NOTE — PROVIDER CONTACT NOTE (OTHER) - ASSESSMENT
patient has no s/s of chest pain, shortness of breath, headache, dizziness, blurry vision; no change in mentation.

## 2025-02-03 LAB
ANION GAP SERPL CALC-SCNC: 14 MMOL/L — SIGNIFICANT CHANGE UP (ref 7–14)
BUN SERPL-MCNC: 27 MG/DL — HIGH (ref 7–23)
CALCIUM SERPL-MCNC: 8.7 MG/DL — SIGNIFICANT CHANGE UP (ref 8.4–10.5)
CHLORIDE SERPL-SCNC: 108 MMOL/L — HIGH (ref 98–107)
CO2 SERPL-SCNC: 21 MMOL/L — LOW (ref 22–31)
CREAT SERPL-MCNC: 0.88 MG/DL — SIGNIFICANT CHANGE UP (ref 0.5–1.3)
CULTURE RESULTS: SIGNIFICANT CHANGE UP
CULTURE RESULTS: SIGNIFICANT CHANGE UP
EGFR: 83 ML/MIN/1.73M2 — SIGNIFICANT CHANGE UP
GLUCOSE SERPL-MCNC: 98 MG/DL — SIGNIFICANT CHANGE UP (ref 70–99)
HCT VFR BLD CALC: 38.3 % — LOW (ref 39–50)
HGB BLD-MCNC: 13.2 G/DL — SIGNIFICANT CHANGE UP (ref 13–17)
MAGNESIUM SERPL-MCNC: 2.1 MG/DL — SIGNIFICANT CHANGE UP (ref 1.6–2.6)
MCHC RBC-ENTMCNC: 31.1 PG — SIGNIFICANT CHANGE UP (ref 27–34)
MCHC RBC-ENTMCNC: 34.5 G/DL — SIGNIFICANT CHANGE UP (ref 32–36)
MCV RBC AUTO: 90.3 FL — SIGNIFICANT CHANGE UP (ref 80–100)
NRBC # BLD AUTO: 0 K/UL — SIGNIFICANT CHANGE UP (ref 0–0)
NRBC # BLD: 0 /100 WBCS — SIGNIFICANT CHANGE UP (ref 0–0)
NRBC # FLD: 0 K/UL — SIGNIFICANT CHANGE UP (ref 0–0)
NRBC BLD-RTO: 0 /100 WBCS — SIGNIFICANT CHANGE UP (ref 0–0)
PHOSPHATE SERPL-MCNC: 2.9 MG/DL — SIGNIFICANT CHANGE UP (ref 2.5–4.5)
PLATELET # BLD AUTO: 275 K/UL — SIGNIFICANT CHANGE UP (ref 150–400)
POTASSIUM SERPL-MCNC: 3.5 MMOL/L — SIGNIFICANT CHANGE UP (ref 3.5–5.3)
POTASSIUM SERPL-SCNC: 3.5 MMOL/L — SIGNIFICANT CHANGE UP (ref 3.5–5.3)
RBC # BLD: 4.24 M/UL — SIGNIFICANT CHANGE UP (ref 4.2–5.8)
RBC # FLD: 12.8 % — SIGNIFICANT CHANGE UP (ref 10.3–14.5)
SODIUM SERPL-SCNC: 143 MMOL/L — SIGNIFICANT CHANGE UP (ref 135–145)
SPECIMEN SOURCE: SIGNIFICANT CHANGE UP
SPECIMEN SOURCE: SIGNIFICANT CHANGE UP
WBC # BLD: 9.28 K/UL — SIGNIFICANT CHANGE UP (ref 3.8–10.5)
WBC # FLD AUTO: 9.28 K/UL — SIGNIFICANT CHANGE UP (ref 3.8–10.5)

## 2025-02-03 PROCEDURE — 99233 SBSQ HOSP IP/OBS HIGH 50: CPT

## 2025-02-03 RX ORDER — SODIUM CHLORIDE 9 G/1000ML
1000 INJECTION, SOLUTION INTRAVENOUS
Refills: 0 | Status: DISCONTINUED | OUTPATIENT
Start: 2025-02-03 | End: 2025-02-04

## 2025-02-03 RX ORDER — HALOPERIDOL 10 MG/1
2.5 TABLET ORAL ONCE
Refills: 0 | Status: COMPLETED | OUTPATIENT
Start: 2025-02-03 | End: 2025-02-03

## 2025-02-03 RX ADMIN — Medication 100 MILLIEQUIVALENT(S): at 09:30

## 2025-02-03 RX ADMIN — REMDESIVIR 200 MILLIGRAM(S): 5 INJECTION INTRAVENOUS at 12:18

## 2025-02-03 RX ADMIN — Medication 100 MILLIEQUIVALENT(S): at 10:40

## 2025-02-03 RX ADMIN — SODIUM CHLORIDE 75 MILLILITER(S): 9 INJECTION, SOLUTION INTRAVENOUS at 17:41

## 2025-02-03 RX ADMIN — METOPROLOL SUCCINATE 5 MILLIGRAM(S): 50 TABLET, EXTENDED RELEASE ORAL at 05:55

## 2025-02-03 RX ADMIN — HALOPERIDOL 2.5 MILLIGRAM(S): 10 TABLET ORAL at 01:30

## 2025-02-03 RX ADMIN — ENOXAPARIN SODIUM 40 MILLIGRAM(S): 100 INJECTION SUBCUTANEOUS at 11:29

## 2025-02-03 NOTE — PROGRESS NOTE ADULT - PROBLEM SELECTOR PLAN 2
Likely d/t COVID infection, has underlying dementia   - more alert  - management of COVID as above  - clinically dry, IVF for maintenance.

## 2025-02-03 NOTE — PROGRESS NOTE ADULT - PROBLEM SELECTOR PLAN 3
New Afib w/ RVR in setting of underlying sepsis, now w/ sinus bradycardia   - s/p IV fluids   - started IV Lopressor 5mg IV q6h   - s/p digoxin load, hold digoxin   - check TTE   - Telemetry monitoring  - defer anticoagulation given fall risk and advanced dementia  - cardiology input appreciated

## 2025-02-03 NOTE — PROGRESS NOTE ADULT - SUBJECTIVE AND OBJECTIVE BOX
[facility] Division of Hospital Medicine  Ever Rodrigues MD  In House Pager [phone]    Patient is a 89y old  Male who presents with a chief complaint of AMS (03 Feb 2025 08:33)    OVERNIGHT EVENTS: no acute events.   SUBJECTIVE: no new subjective symptoms.  ROS: unable to obtain    MEDICATIONS  (STANDING):  enoxaparin Injectable 40 milliGRAM(s) SubCutaneous every 24 hours  metoprolol tartrate Injectable 5 milliGRAM(s) IV Push every 6 hours    MEDICATIONS  (PRN):    CAPILLARY BLOOD GLUCOSE        I&O's Summary      Vital Signs Last 24 Hrs  T(C): 36.8 (03 Feb 2025 15:45), Max: 37.3 (03 Feb 2025 11:45)  T(F): 98.3 (03 Feb 2025 15:45), Max: 99.1 (03 Feb 2025 11:45)  HR: 62 (03 Feb 2025 15:45) (62 - 121)  BP: 160/97 (03 Feb 2025 15:45) (145/68 - 191/83)  BP(mean): --  RR: 16 (03 Feb 2025 15:45) (16 - 18)  SpO2: 95% (03 Feb 2025 15:45) (95% - 99%)    Parameters below as of 03 Feb 2025 15:45  Patient On (Oxygen Delivery Method): room air        LABS:                        13.2   9.28  )-----------( 275      ( 03 Feb 2025 06:20 )             38.3     02-03    143  |  108[H]  |  27[H]  ----------------------------<  98  3.5   |  21[L]  |  0.88    Ca    8.7      03 Feb 2025 06:20  Phos  2.9     02-03  Mg     2.10     02-03            Urinalysis Basic - ( 03 Feb 2025 06:20 )    Color: x / Appearance: x / SG: x / pH: x  Gluc: 98 mg/dL / Ketone: x  / Bili: x / Urobili: x   Blood: x / Protein: x / Nitrite: x   Leuk Esterase: x / RBC: x / WBC x   Sq Epi: x / Non Sq Epi: x / Bacteria: x        RADIOLOGY & ADDITIONAL TESTS:  Results Reviewed: Y  Imaging Personally Reviewed: Y  Electrocardiogram Personally Reviewed: Y    COORDINATION OF CARE:  Care Discussed with Consultants/Other Providers [Y/N]: Y  Prior or Outpatient Records Reviewed [Y/N]: Y

## 2025-02-03 NOTE — PROVIDER CONTACT NOTE (OTHER) - ASSESSMENT
patient has no s/s of chest pain, shortness of breath, headache, dizziness, blurry vision; no change in mentation. Pt is very tense and restless when taking BP

## 2025-02-03 NOTE — PROGRESS NOTE ADULT - SUBJECTIVE AND OBJECTIVE BOX
Cardiovascular Disease Progress Note  DATE OF SERVICE: 02-03-25 @ 11:33    Overnight events: No acute events overnight.    The patient is in no distress.   Otherwise review of systems negative    Objective Findings:  T(C): 36.5 (02-03-25 @ 05:45), Max: 37.2 (02-02-25 @ 12:05)  HR: 121 (02-03-25 @ 05:45) (64 - 121)  BP: 157/82 (02-03-25 @ 05:45) (112/93 - 191/83)  RR: 18 (02-03-25 @ 05:45) (17 - 18)  SpO2: 97% (02-03-25 @ 05:45) (95% - 99%)  Wt(kg): --  Daily Height in cm: 157.48 (03 Feb 2025 09:54)    Daily       Physical Exam:  Gen: NAD; Patient resting comfortably  HEENT:   Normocephalic/ atraumatic  CV: RRR, normal S1 + S2, no m/r/g  Lungs:  Normal respiratory effort; clear to auscultation bilaterally  Abd: soft, non-tender; bowel sounds present  Ext: No edema; warm and well perfused    Telemetry: Sinus    Laboratory Data:                        13.2   9.28  )-----------( 275      ( 03 Feb 2025 06:20 )             38.3     02-03    143  |  108[H]  |  27[H]  ----------------------------<  98  3.5   |  21[L]  |  0.88    Ca    8.7      03 Feb 2025 06:20  Phos  2.9     02-03  Mg     2.10     02-03                Inpatient Medications:  MEDICATIONS  (STANDING):  enoxaparin Injectable 40 milliGRAM(s) SubCutaneous every 24 hours  metoprolol tartrate Injectable 5 milliGRAM(s) IV Push every 6 hours  remdesivir  IVPB   IV Intermittent   remdesivir  IVPB 100 milliGRAM(s) IV Intermittent every 24 hours      Assessment: 89 year old man with HTN, hyperlipidemia, and advanced dementia presents with a-fib with RVR and COVID-19.     Plan of Care:    #A-fib with RVR-  The patient has intermittent bursts of a-fib, but has overall maintained sinus overnight.   Continue Lopressor to 5 mg IV q 6 hours standing.  Given debility, fall risk, and advanced dementia, the risks of anticoagulation outweigh the benefits.    #COVID-19-  Remdesivir as per the primary team.        High and complex medical decision making in this patient with comorbidities.              Over 55 minutes spent on total encounter; more than 50% of the visit was spent counseling and/or coordinating care by the attending physician.      Mello Kinney MD [facility]  Cardiovascular Disease  [phone]

## 2025-02-03 NOTE — PROGRESS NOTE ADULT - PROBLEM SELECTOR PLAN 8
- in setting of dementia   - failed speech and swallow  - spoke w/ daughter, not interested in PEG and would like to initiate pleasure feeds. Daughter understands risk of aspiration.   - start pureed diet w/ moderate thickened liquids   - aspiration precautions

## 2025-02-04 LAB
ANION GAP SERPL CALC-SCNC: 14 MMOL/L — SIGNIFICANT CHANGE UP (ref 7–14)
BUN SERPL-MCNC: 23 MG/DL — SIGNIFICANT CHANGE UP (ref 7–23)
CALCIUM SERPL-MCNC: 8.8 MG/DL — SIGNIFICANT CHANGE UP (ref 8.4–10.5)
CHLORIDE SERPL-SCNC: 106 MMOL/L — SIGNIFICANT CHANGE UP (ref 98–107)
CO2 SERPL-SCNC: 21 MMOL/L — LOW (ref 22–31)
CREAT SERPL-MCNC: 0.82 MG/DL — SIGNIFICANT CHANGE UP (ref 0.5–1.3)
EGFR: 84 ML/MIN/1.73M2 — SIGNIFICANT CHANGE UP
GLUCOSE SERPL-MCNC: 96 MG/DL — SIGNIFICANT CHANGE UP (ref 70–99)
HCT VFR BLD CALC: 41.3 % — SIGNIFICANT CHANGE UP (ref 39–50)
HGB BLD-MCNC: 13.9 G/DL — SIGNIFICANT CHANGE UP (ref 13–17)
MAGNESIUM SERPL-MCNC: 2.1 MG/DL — SIGNIFICANT CHANGE UP (ref 1.6–2.6)
MCHC RBC-ENTMCNC: 30.8 PG — SIGNIFICANT CHANGE UP (ref 27–34)
MCHC RBC-ENTMCNC: 33.7 G/DL — SIGNIFICANT CHANGE UP (ref 32–36)
MCV RBC AUTO: 91.6 FL — SIGNIFICANT CHANGE UP (ref 80–100)
NRBC # BLD AUTO: 0 K/UL — SIGNIFICANT CHANGE UP (ref 0–0)
NRBC # BLD: 0 /100 WBCS — SIGNIFICANT CHANGE UP (ref 0–0)
NRBC # FLD: 0 K/UL — SIGNIFICANT CHANGE UP (ref 0–0)
NRBC BLD-RTO: 0 /100 WBCS — SIGNIFICANT CHANGE UP (ref 0–0)
PHOSPHATE SERPL-MCNC: 2.9 MG/DL — SIGNIFICANT CHANGE UP (ref 2.5–4.5)
PLATELET # BLD AUTO: 296 K/UL — SIGNIFICANT CHANGE UP (ref 150–400)
POTASSIUM SERPL-MCNC: 3.7 MMOL/L — SIGNIFICANT CHANGE UP (ref 3.5–5.3)
POTASSIUM SERPL-SCNC: 3.7 MMOL/L — SIGNIFICANT CHANGE UP (ref 3.5–5.3)
RBC # BLD: 4.51 M/UL — SIGNIFICANT CHANGE UP (ref 4.2–5.8)
RBC # FLD: 13.1 % — SIGNIFICANT CHANGE UP (ref 10.3–14.5)
SODIUM SERPL-SCNC: 141 MMOL/L — SIGNIFICANT CHANGE UP (ref 135–145)
WBC # BLD: 10.66 K/UL — HIGH (ref 3.8–10.5)
WBC # FLD AUTO: 10.66 K/UL — HIGH (ref 3.8–10.5)

## 2025-02-04 PROCEDURE — 99233 SBSQ HOSP IP/OBS HIGH 50: CPT

## 2025-02-04 RX ORDER — METOPROLOL SUCCINATE 50 MG/1
5 TABLET, EXTENDED RELEASE ORAL ONCE
Refills: 0 | Status: COMPLETED | OUTPATIENT
Start: 2025-02-04 | End: 2025-02-04

## 2025-02-04 RX ORDER — LOSARTAN POTASSIUM 100 MG/1
25 TABLET, FILM COATED ORAL DAILY
Refills: 0 | Status: DISCONTINUED | OUTPATIENT
Start: 2025-02-04 | End: 2025-02-08

## 2025-02-04 RX ORDER — SODIUM CHLORIDE 9 G/1000ML
1000 INJECTION, SOLUTION INTRAVENOUS
Refills: 0 | Status: DISCONTINUED | OUTPATIENT
Start: 2025-02-04 | End: 2025-02-15

## 2025-02-04 RX ORDER — LOSARTAN POTASSIUM 100 MG/1
25 TABLET, FILM COATED ORAL DAILY
Refills: 0 | Status: DISCONTINUED | OUTPATIENT
Start: 2025-02-04 | End: 2025-02-04

## 2025-02-04 RX ORDER — METOPROLOL SUCCINATE 50 MG/1
5 TABLET, EXTENDED RELEASE ORAL EVERY 6 HOURS
Refills: 0 | Status: DISCONTINUED | OUTPATIENT
Start: 2025-02-04 | End: 2025-02-05

## 2025-02-04 RX ORDER — LABETALOL HYDROCHLORIDE 200 MG/1
10 TABLET, FILM COATED ORAL ONCE
Refills: 0 | Status: COMPLETED | OUTPATIENT
Start: 2025-02-04 | End: 2025-02-04

## 2025-02-04 RX ADMIN — METOPROLOL SUCCINATE 5 MILLIGRAM(S): 50 TABLET, EXTENDED RELEASE ORAL at 23:41

## 2025-02-04 RX ADMIN — ENOXAPARIN SODIUM 40 MILLIGRAM(S): 100 INJECTION SUBCUTANEOUS at 10:57

## 2025-02-04 RX ADMIN — Medication 25 MILLIGRAM(S): at 01:54

## 2025-02-04 RX ADMIN — LABETALOL HYDROCHLORIDE 10 MILLIGRAM(S): 200 TABLET, FILM COATED ORAL at 05:53

## 2025-02-04 RX ADMIN — METOPROLOL SUCCINATE 5 MILLIGRAM(S): 50 TABLET, EXTENDED RELEASE ORAL at 17:05

## 2025-02-04 RX ADMIN — SODIUM CHLORIDE 75 MILLILITER(S): 9 INJECTION, SOLUTION INTRAVENOUS at 23:36

## 2025-02-04 RX ADMIN — SODIUM CHLORIDE 75 MILLILITER(S): 9 INJECTION, SOLUTION INTRAVENOUS at 13:14

## 2025-02-04 RX ADMIN — METOPROLOL SUCCINATE 5 MILLIGRAM(S): 50 TABLET, EXTENDED RELEASE ORAL at 16:12

## 2025-02-04 NOTE — PROGRESS NOTE ADULT - PROBLEM SELECTOR PLAN 3
New Afib w/ RVR in setting of underlying sepsis, now w/ sinus bradycardia   - s/p digoxin load, hold digoxin   - Telemetry monitoring, intermittent tachycardia.   - defer anticoagulation given fall risk and advanced dementia  - cardiology input appreciated  - check TTE   - c/w metoprolol 5mg IVP q6h.

## 2025-02-04 NOTE — PHYSICAL THERAPY INITIAL EVALUATION ADULT - ADDITIONAL COMMENTS
Unable to obtain accurate social history secondary to pt. presenting with confusion during subjective history and presenting with difficulty following commands. per care coordination assessment on 1/31/25, Pt lives with family in a house with 5-6 stairs to enter, +1 flight of stairs to his bedroom. prior to admission Pt ambulated independently. Pt has 24/7 home health aides. Medical equipment: shower chair, grab bars.     Pt. left comfortable in bed with all tubes/lines intact, +bed alarm, call bell in reach and in NAD. RNJuanpablo, aware of session and pt current position.

## 2025-02-04 NOTE — PROVIDER CONTACT NOTE (OTHER) - ASSESSMENT
Patient not tolerating PO meds. Patient pocketing meds while given with applesauce. Not swallowing. Suction set-up at bedside and used as needed. Aspiration precautions maintained.

## 2025-02-04 NOTE — PROGRESS NOTE ADULT - SUBJECTIVE AND OBJECTIVE BOX
[facility] Division of Hospital Medicine  Ever Rodrigues MD  In House Pager [phone]    Patient is a 89y old  Male who presents with a chief complaint of AMS (04 Feb 2025 08:24)    OVERNIGHT EVENTS: no acute events.   SUBJECTIVE: no new subjective symptoms.   ROS: unable to obtain due to mental status.     MEDICATIONS  (STANDING):  dextrose 5% + sodium chloride 0.45%. 1000 milliLiter(s) (75 mL/Hr) IV Continuous <Continuous>  enoxaparin Injectable 40 milliGRAM(s) SubCutaneous every 24 hours  losartan 25 milliGRAM(s) Oral daily  metoprolol tartrate Injectable 5 milliGRAM(s) IV Push every 6 hours    MEDICATIONS  (PRN):    CAPILLARY BLOOD GLUCOSE        I&O's Summary      Vital Signs Last 24 Hrs  T(C): 36.7 (04 Feb 2025 16:09), Max: 37.2 (03 Feb 2025 22:27)  T(F): 98.1 (04 Feb 2025 16:09), Max: 98.9 (03 Feb 2025 22:27)  HR: 149 (04 Feb 2025 16:09) (64 - 155)  BP: 113/88 (04 Feb 2025 16:09) (113/88 - 190/95)  BP(mean): --  RR: 20 (04 Feb 2025 16:09) (16 - 20)  SpO2: 98% (04 Feb 2025 16:09) (95% - 99%)    Parameters below as of 04 Feb 2025 16:09  Patient On (Oxygen Delivery Method): room air        LABS:                        13.9   10.66 )-----------( 296      ( 04 Feb 2025 06:57 )             41.3     02-04    141  |  106  |  23  ----------------------------<  96  3.7   |  21[L]  |  0.82    Ca    8.8      04 Feb 2025 06:57  Phos  2.9     02-04  Mg     2.10     02-04            Urinalysis Basic - ( 04 Feb 2025 06:57 )    Color: x / Appearance: x / SG: x / pH: x  Gluc: 96 mg/dL / Ketone: x  / Bili: x / Urobili: x   Blood: x / Protein: x / Nitrite: x   Leuk Esterase: x / RBC: x / WBC x   Sq Epi: x / Non Sq Epi: x / Bacteria: x        RADIOLOGY & ADDITIONAL TESTS:  Results Reviewed: Y  Imaging Personally Reviewed: Y  Electrocardiogram Personally Reviewed: Y    COORDINATION OF CARE:  Care Discussed with Consultants/Other Providers [Y/N]: Y  Prior or Outpatient Records Reviewed [Y/N]: Y

## 2025-02-04 NOTE — PROGRESS NOTE ADULT - SUBJECTIVE AND OBJECTIVE BOX
Cardiovascular Disease Progress Note  DATE OF SERVICE: 02-04-25 @ 08:24    Overnight events: No acute events overnight.    The patient is lethargic.        Objective Findings:  T(C): 36.8 (02-04-25 @ 05:22), Max: 37.3 (02-03-25 @ 11:45)  HR: 80 (02-04-25 @ 05:22) (62 - 80)  BP: 190/95 (02-04-25 @ 05:22) (150/86 - 190/95)  RR: 18 (02-04-25 @ 05:22) (16 - 18)  SpO2: 98% (02-04-25 @ 05:22) (95% - 99%)  Wt(kg): --  Daily Height in cm: 157.48 (03 Feb 2025 09:54)    Daily       Physical Exam:  Gen: NAD; Patient resting comfortably  HEENT:  Normocephalic/ atraumatic  CV: RRR, normal S1 + S2, no m/r/g  Lungs:  Normal respiratory effort; clear to auscultation bilaterally  Abd: soft, non-tender; bowel sounds present  Ext: No edema; warm and well perfused    Telemetry: Sinus    Laboratory Data:                        13.9   10.66 )-----------( 296      ( 04 Feb 2025 06:57 )             41.3     02-03    143  |  108[H]  |  27[H]  ----------------------------<  98  3.5   |  21[L]  |  0.88    Ca    8.7      03 Feb 2025 06:20  Phos  2.9     02-03  Mg     2.10     02-03                Inpatient Medications:  MEDICATIONS  (STANDING):  enoxaparin Injectable 40 milliGRAM(s) SubCutaneous every 24 hours  lactated ringers. 1000 milliLiter(s) (75 mL/Hr) IV Continuous <Continuous>  metoprolol tartrate Injectable 5 milliGRAM(s) IV Push every 6 hours      Assessment: 89 year old man with HTN, hyperlipidemia, and advanced dementia presents with a-fib with RVR and COVID-19.     Plan of Care:    #A-fib with RVR-  The patient has intermittent bursts of a-fib, but has overall maintained sinus rhythm.  May transition to oral Lopressor if the patient can tolerate pills.   Given debility, fall risk, and advanced dementia, the risks of anticoagulation outweigh the benefits.    #COVID-19-  s/p remdesivir as per the primary team.        High and complex medical decision making in this patient with comorbidities.        Over 55 minutes spent on total encounter; more than 50% of the visit was spent counseling and/or coordinating care by the attending physician.      Mello Kinney MD [facility]  Cardiovascular Disease  [phone]

## 2025-02-04 NOTE — PROGRESS NOTE ADULT - PROBLEM SELECTOR PLAN 8
- in setting of dementia   - failed speech and swallow  - spoke w/ daughter, not interested in PEG and would like to initiate pleasure feeds. Daughter understands risk of aspiration.   - start pureed diet w/ moderate thickened liquids   - aspiration precautions  inadequate PO intake due to encephalopathy - gentle hydration

## 2025-02-04 NOTE — PHYSICAL THERAPY INITIAL EVALUATION ADULT - NSPTDISCHREC_GEN_A_CORE
pt. not placed on skilled PT services at this time secondary to pt. presenting with confusion and unable to actively participate in PT services. Please reconsult if appropriate/feasible. pt. not placed on skilled PT services at this time secondary to pt. presenting with confusion and unable to actively participate in PT services. Please reconsult if appropriate/feasible./No skilled PT needs

## 2025-02-05 LAB
ANION GAP SERPL CALC-SCNC: 14 MMOL/L — SIGNIFICANT CHANGE UP (ref 7–14)
BUN SERPL-MCNC: 19 MG/DL — SIGNIFICANT CHANGE UP (ref 7–23)
CALCIUM SERPL-MCNC: 8.5 MG/DL — SIGNIFICANT CHANGE UP (ref 8.4–10.5)
CHLORIDE SERPL-SCNC: 106 MMOL/L — SIGNIFICANT CHANGE UP (ref 98–107)
CO2 SERPL-SCNC: 17 MMOL/L — LOW (ref 22–31)
CREAT SERPL-MCNC: 0.79 MG/DL — SIGNIFICANT CHANGE UP (ref 0.5–1.3)
EGFR: 85 ML/MIN/1.73M2 — SIGNIFICANT CHANGE UP
GLUCOSE SERPL-MCNC: 121 MG/DL — HIGH (ref 70–99)
MAGNESIUM SERPL-MCNC: 2.1 MG/DL — SIGNIFICANT CHANGE UP (ref 1.6–2.6)
PHOSPHATE SERPL-MCNC: 3.1 MG/DL — SIGNIFICANT CHANGE UP (ref 2.5–4.5)
POTASSIUM SERPL-MCNC: 4 MMOL/L — SIGNIFICANT CHANGE UP (ref 3.5–5.3)
POTASSIUM SERPL-SCNC: 4 MMOL/L — SIGNIFICANT CHANGE UP (ref 3.5–5.3)
SODIUM SERPL-SCNC: 137 MMOL/L — SIGNIFICANT CHANGE UP (ref 135–145)

## 2025-02-05 PROCEDURE — 99233 SBSQ HOSP IP/OBS HIGH 50: CPT

## 2025-02-05 RX ORDER — METOPROLOL SUCCINATE 50 MG/1
12.5 TABLET, EXTENDED RELEASE ORAL
Refills: 0 | Status: DISCONTINUED | OUTPATIENT
Start: 2025-02-05 | End: 2025-02-05

## 2025-02-05 RX ORDER — METOPROLOL SUCCINATE 50 MG/1
5 TABLET, EXTENDED RELEASE ORAL EVERY 6 HOURS
Refills: 0 | Status: DISCONTINUED | OUTPATIENT
Start: 2025-02-05 | End: 2025-02-07

## 2025-02-05 RX ORDER — METOPROLOL SUCCINATE 50 MG/1
5 TABLET, EXTENDED RELEASE ORAL ONCE
Refills: 0 | Status: COMPLETED | OUTPATIENT
Start: 2025-02-05 | End: 2025-02-05

## 2025-02-05 RX ADMIN — METOPROLOL SUCCINATE 5 MILLIGRAM(S): 50 TABLET, EXTENDED RELEASE ORAL at 05:59

## 2025-02-05 RX ADMIN — ENOXAPARIN SODIUM 40 MILLIGRAM(S): 100 INJECTION SUBCUTANEOUS at 12:00

## 2025-02-05 RX ADMIN — SODIUM CHLORIDE 75 MILLILITER(S): 9 INJECTION, SOLUTION INTRAVENOUS at 23:57

## 2025-02-05 RX ADMIN — METOPROLOL SUCCINATE 5 MILLIGRAM(S): 50 TABLET, EXTENDED RELEASE ORAL at 17:06

## 2025-02-05 RX ADMIN — METOPROLOL SUCCINATE 5 MILLIGRAM(S): 50 TABLET, EXTENDED RELEASE ORAL at 13:11

## 2025-02-05 NOTE — PROGRESS NOTE ADULT - SUBJECTIVE AND OBJECTIVE BOX
[facility]  Ever Rodrigues MD  In House Pager [phone]    Patient is a 89y old  Male who presents with a chief complaint of AMS (05 Feb 2025 07:50)    OVERNIGHT EVENTS: no acute events.   SUBJECTIVE: no new subjective symptoms per family at bedside. able to eat food from home.   ROS: unable to obtain due to mental status.     MEDICATIONS  (STANDING):  dextrose 5% + sodium chloride 0.45%. 1000 milliLiter(s) (75 mL/Hr) IV Continuous <Continuous>  enoxaparin Injectable 40 milliGRAM(s) SubCutaneous every 24 hours  losartan 25 milliGRAM(s) Oral daily  metoprolol tartrate Injectable 5 milliGRAM(s) IV Push every 6 hours    MEDICATIONS  (PRN):    CAPILLARY BLOOD GLUCOSE        I&O's Summary      Vital Signs Last 24 Hrs  T(C): 36.9 (05 Feb 2025 12:15), Max: 36.9 (05 Feb 2025 12:15)  T(F): 98.5 (05 Feb 2025 12:15), Max: 98.5 (05 Feb 2025 12:15)  HR: 69 (05 Feb 2025 14:15) (60 - 146)  BP: 132/74 (05 Feb 2025 12:15) (124/88 - 160/78)  BP(mean): --  RR: 18 (05 Feb 2025 12:15) (18 - 20)  SpO2: 95% (05 Feb 2025 12:15) (95% - 98%)    Parameters below as of 05 Feb 2025 12:15  Patient On (Oxygen Delivery Method): room air        LABS:                        13.9   10.66 )-----------( 296      ( 04 Feb 2025 06:57 )             41.3     02-05    137  |  106  |  19  ----------------------------<  121[H]  4.0   |  17[L]  |  0.79    Ca    8.5      05 Feb 2025 07:05  Phos  3.1     02-05  Mg     2.10     02-05            Urinalysis Basic - ( 05 Feb 2025 07:05 )    Color: x / Appearance: x / SG: x / pH: x  Gluc: 121 mg/dL / Ketone: x  / Bili: x / Urobili: x   Blood: x / Protein: x / Nitrite: x   Leuk Esterase: x / RBC: x / WBC x   Sq Epi: x / Non Sq Epi: x / Bacteria: x        RADIOLOGY & ADDITIONAL TESTS:  Results Reviewed: Y  Imaging Personally Reviewed: Y  Electrocardiogram Personally Reviewed: Y    COORDINATION OF CARE:  Care Discussed with Consultants/Other Providers [Y/N]: Y  Prior or Outpatient Records Reviewed [Y/N]: Y

## 2025-02-05 NOTE — PROGRESS NOTE ADULT - PROBLEM SELECTOR PLAN 9
Pt went to the OR.  Electronically signed by Doe Osorio RN on 9/8/2020 at 8:51 PM DVT ppx:  Lovenox    Dispo:  PT reeval if mental status improves.     Code Status:  DNR/DNI    POC d/w daughter 2/5

## 2025-02-05 NOTE — PROGRESS NOTE ADULT - PROBLEM SELECTOR PLAN 8
- in setting of dementia   - failed speech and swallow  - spoke w/ daughter, not interested in PEG and would like to initiate pleasure feeds. Daughter understands risk of aspiration.   - pureed diet w/ moderate thickened liquids   - aspiration precautions  inadequate PO intake due to encephalopathy - gentle hydration

## 2025-02-05 NOTE — PROGRESS NOTE ADULT - SUBJECTIVE AND OBJECTIVE BOX
Cardiovascular Disease Progress Note  DATE OF SERVICE: 02-05-25 @ 07:50    Overnight events: No acute events overnight.    Mr. Mckenna remains lethargic.  He is in no distress.        Objective Findings:  T(C): 36.8 (02-05-25 @ 05:58), Max: 36.8 (02-04-25 @ 20:21)  HR: 75 (02-05-25 @ 05:58) (68 - 155)  BP: 144/88 (02-05-25 @ 05:58) (113/88 - 170/78)  RR: 18 (02-05-25 @ 05:58) (16 - 18)  SpO2: 97% (02-05-25 @ 05:58) (96% - 99%)  Wt(kg): --  Daily     Daily       Physical Exam:  Gen: NAD; Patient resting comfortably  HEENT: Normocephalic/ atraumatic  CV: RRR, normal S1 + S2  Lungs:  Normal respiratory effort; clear to auscultation bilaterally  Abd: soft, non-tender; bowel sounds present  Ext: No edema; warm and well perfused    Telemetry: Sinus    Laboratory Data:                        13.9   10.66 )-----------( 296      ( 04 Feb 2025 06:57 )             41.3     02-04    141  |  106  |  23  ----------------------------<  96  3.7   |  21[L]  |  0.82    Ca    8.8      04 Feb 2025 06:57  Phos  2.9     02-04  Mg     2.10     02-04                Inpatient Medications:  MEDICATIONS  (STANDING):  dextrose 5% + sodium chloride 0.45%. 1000 milliLiter(s) (75 mL/Hr) IV Continuous <Continuous>  enoxaparin Injectable 40 milliGRAM(s) SubCutaneous every 24 hours  losartan 25 milliGRAM(s) Oral daily  metoprolol tartrate Injectable 5 milliGRAM(s) IV Push every 6 hours      Assessment:  89 year old man with HTN, hyperlipidemia, and advanced dementia presents with a-fib with RVR and COVID-19.     Plan of Care:    #A-fib with RVR-  The patient has brief bursts of a-fib, but is overall maintaining sinus rhythm.  Continue IV Lopressor as the patient cannot tolerate pills.   Given debility, fall risk, and advanced dementia, the risks of anticoagulation outweigh the benefits.    #COVID-19-  s/p remdesivir by the primary team.        Over 55 minutes spent on total encounter; more than 50% of the visit was spent counseling and/or coordinating care by the attending physician.      Mello Kinney MD [facility]  Cardiovascular Disease  [phone]

## 2025-02-05 NOTE — PROGRESS NOTE ADULT - PROBLEM SELECTOR PLAN 3
New Afib w/ RVR in setting of underlying sepsis, now w/ sinus bradycardia   - s/p digoxin load, hold digoxin   - Telemetry monitoring, intermittent tachycardia.   - defer anticoagulation given fall risk and advanced dementia  - cardiology input appreciated  - TTE pending.   - c/w metoprolol 5mg IVP q6h. not consistent with PO meds.

## 2025-02-06 ENCOUNTER — RESULT REVIEW (OUTPATIENT)
Age: 89
End: 2025-02-06

## 2025-02-06 LAB
ANION GAP SERPL CALC-SCNC: 14 MMOL/L — SIGNIFICANT CHANGE UP (ref 7–14)
BASOPHILS # BLD AUTO: 0.04 K/UL — SIGNIFICANT CHANGE UP (ref 0–0.2)
BASOPHILS NFR BLD AUTO: 0.3 % — SIGNIFICANT CHANGE UP (ref 0–2)
BUN SERPL-MCNC: 16 MG/DL — SIGNIFICANT CHANGE UP (ref 7–23)
CALCIUM SERPL-MCNC: 8.5 MG/DL — SIGNIFICANT CHANGE UP (ref 8.4–10.5)
CHLORIDE SERPL-SCNC: 105 MMOL/L — SIGNIFICANT CHANGE UP (ref 98–107)
CO2 SERPL-SCNC: 18 MMOL/L — LOW (ref 22–31)
CREAT SERPL-MCNC: 0.82 MG/DL — SIGNIFICANT CHANGE UP (ref 0.5–1.3)
EGFR: 84 ML/MIN/1.73M2 — SIGNIFICANT CHANGE UP
EOSINOPHIL # BLD AUTO: 0.26 K/UL — SIGNIFICANT CHANGE UP (ref 0–0.5)
EOSINOPHIL NFR BLD AUTO: 1.9 % — SIGNIFICANT CHANGE UP (ref 0–6)
GLUCOSE SERPL-MCNC: 95 MG/DL — SIGNIFICANT CHANGE UP (ref 70–99)
HCT VFR BLD CALC: 39.7 % — SIGNIFICANT CHANGE UP (ref 39–50)
HGB BLD-MCNC: 13.4 G/DL — SIGNIFICANT CHANGE UP (ref 13–17)
IANC: 11.08 K/UL — HIGH (ref 1.8–7.4)
IMM GRANULOCYTES NFR BLD AUTO: 0.7 % — SIGNIFICANT CHANGE UP (ref 0–0.9)
LYMPHOCYTES # BLD AUTO: 1.08 K/UL — SIGNIFICANT CHANGE UP (ref 1–3.3)
LYMPHOCYTES # BLD AUTO: 7.9 % — LOW (ref 13–44)
MAGNESIUM SERPL-MCNC: 2 MG/DL — SIGNIFICANT CHANGE UP (ref 1.6–2.6)
MCHC RBC-ENTMCNC: 30.5 PG — SIGNIFICANT CHANGE UP (ref 27–34)
MCHC RBC-ENTMCNC: 33.8 G/DL — SIGNIFICANT CHANGE UP (ref 32–36)
MCV RBC AUTO: 90.2 FL — SIGNIFICANT CHANGE UP (ref 80–100)
MONOCYTES # BLD AUTO: 1.08 K/UL — HIGH (ref 0–0.9)
MONOCYTES NFR BLD AUTO: 7.9 % — SIGNIFICANT CHANGE UP (ref 2–14)
NEUTROPHILS # BLD AUTO: 11.08 K/UL — HIGH (ref 1.8–7.4)
NEUTROPHILS NFR BLD AUTO: 81.3 % — HIGH (ref 43–77)
NRBC # BLD AUTO: 0 K/UL — SIGNIFICANT CHANGE UP (ref 0–0)
NRBC # BLD: 0 /100 WBCS — SIGNIFICANT CHANGE UP (ref 0–0)
NRBC # FLD: 0 K/UL — SIGNIFICANT CHANGE UP (ref 0–0)
NRBC BLD-RTO: 0 /100 WBCS — SIGNIFICANT CHANGE UP (ref 0–0)
PHOSPHATE SERPL-MCNC: 2.7 MG/DL — SIGNIFICANT CHANGE UP (ref 2.5–4.5)
PLATELET # BLD AUTO: 352 K/UL — SIGNIFICANT CHANGE UP (ref 150–400)
POTASSIUM SERPL-MCNC: 3.9 MMOL/L — SIGNIFICANT CHANGE UP (ref 3.5–5.3)
POTASSIUM SERPL-SCNC: 3.9 MMOL/L — SIGNIFICANT CHANGE UP (ref 3.5–5.3)
RBC # BLD: 4.4 M/UL — SIGNIFICANT CHANGE UP (ref 4.2–5.8)
RBC # FLD: 12.8 % — SIGNIFICANT CHANGE UP (ref 10.3–14.5)
SODIUM SERPL-SCNC: 137 MMOL/L — SIGNIFICANT CHANGE UP (ref 135–145)
WBC # BLD: 13.64 K/UL — HIGH (ref 3.8–10.5)
WBC # FLD AUTO: 13.64 K/UL — HIGH (ref 3.8–10.5)

## 2025-02-06 PROCEDURE — 74018 RADEX ABDOMEN 1 VIEW: CPT | Mod: 26

## 2025-02-06 PROCEDURE — 93306 TTE W/DOPPLER COMPLETE: CPT | Mod: 26

## 2025-02-06 PROCEDURE — 71045 X-RAY EXAM CHEST 1 VIEW: CPT | Mod: 26

## 2025-02-06 PROCEDURE — 99233 SBSQ HOSP IP/OBS HIGH 50: CPT

## 2025-02-06 RX ORDER — FINASTERIDE 1 MG/1
5 TABLET, FILM COATED ORAL DAILY
Refills: 0 | Status: DISCONTINUED | OUTPATIENT
Start: 2025-02-06 | End: 2025-02-15

## 2025-02-06 RX ORDER — TAMSULOSIN HYDROCHLORIDE 0.4 MG/1
0.4 CAPSULE ORAL AT BEDTIME
Refills: 0 | Status: DISCONTINUED | OUTPATIENT
Start: 2025-02-06 | End: 2025-02-15

## 2025-02-06 RX ORDER — MEMANTINE HYDROCHLORIDE 21 MG/1
10 CAPSULE, EXTENDED RELEASE ORAL DAILY
Refills: 0 | Status: DISCONTINUED | OUTPATIENT
Start: 2025-02-06 | End: 2025-02-15

## 2025-02-06 RX ADMIN — METOPROLOL SUCCINATE 5 MILLIGRAM(S): 50 TABLET, EXTENDED RELEASE ORAL at 16:47

## 2025-02-06 RX ADMIN — TAMSULOSIN HYDROCHLORIDE 0.4 MILLIGRAM(S): 0.4 CAPSULE ORAL at 21:19

## 2025-02-06 RX ADMIN — METOPROLOL SUCCINATE 5 MILLIGRAM(S): 50 TABLET, EXTENDED RELEASE ORAL at 00:00

## 2025-02-06 RX ADMIN — METOPROLOL SUCCINATE 5 MILLIGRAM(S): 50 TABLET, EXTENDED RELEASE ORAL at 11:32

## 2025-02-06 RX ADMIN — ENOXAPARIN SODIUM 40 MILLIGRAM(S): 100 INJECTION SUBCUTANEOUS at 11:32

## 2025-02-06 RX ADMIN — SODIUM CHLORIDE 75 MILLILITER(S): 9 INJECTION, SOLUTION INTRAVENOUS at 05:01

## 2025-02-06 RX ADMIN — METOPROLOL SUCCINATE 5 MILLIGRAM(S): 50 TABLET, EXTENDED RELEASE ORAL at 05:00

## 2025-02-06 NOTE — DIETITIAN INITIAL EVALUATION ADULT - PROBLEM SELECTOR PLAN 2
Patient has been altered from baseline dementia, likely in setting of sepsis.  - Will treat underlying sepsis  - Strict NPO for now pending further improvement in mental status  - Maintenance fluids PRN

## 2025-02-06 NOTE — PROGRESS NOTE ADULT - PROBLEM SELECTOR PLAN 2
Likely d/t COVID infection, has underlying dementia   - more alert  - management of COVID as above  - volume status improving.   - c/w IVF.

## 2025-02-06 NOTE — DIETITIAN INITIAL EVALUATION ADULT - ADD RECOMMEND
1. Defer diet/texture modification to medical team/SLP as indicated.  2. Food and nutrition service to provide Might Shake 4 oz. 2x/day, and Magic Cup 4 oz. 2x/day  3. Please obtain reweight  4. Encourage PO intake and honor food preferences as able.  5. Nursing to consistently document % PO intake in RN flow sheets; monitor weekly weights, skin integrity, bowel regimen, and nutrition pertinent labs.

## 2025-02-06 NOTE — DIETITIAN INITIAL EVALUATION ADULT - REASON FOR ADMISSION
Infection due to severe acute respiratory syndrome coronavirus 2 (SARS-CoV-2)  Per chart review, patient is a 89 year old male with a PMHx of HTN, HLD, dementia, and prostate cancer BIBEMS for AMS and weakness, found to be COVID 19 positive, also w/ new onset Afib w/ RVR. remain encephalopathic and unable to tolerate PO.

## 2025-02-06 NOTE — DIETITIAN INITIAL EVALUATION ADULT - ORAL INTAKE PTA/DIET HISTORY
Patient is a [place] speaking male, A&Ox 1 at baseline with dementia. Resting in the room. A family friend, who speaks English, stepped out the room and briefly discussed patient's PO intake in house. This family friend states that she is not the daughter but has been helping patient's family to bring home cook meals and assist in feeding; patient has not been eating much of home cooked meals either. Estimated PO intake is </=50% EER >/= 5 days. This family friend does not want writer to wake up patient, visual observation performed for Nutrition Focused Physical Exam.

## 2025-02-06 NOTE — PROGRESS NOTE ADULT - SUBJECTIVE AND OBJECTIVE BOX
Cardiovascular Disease Progress Note  DATE OF SERVICE: 02-06-25 @ 08:00    Overnight events: No acute events overnight.  The patient is in no distress.     Otherwise review of systems negative    Objective Findings:  T(C): 36.9 (02-06-25 @ 04:30), Max: 37.1 (02-06-25 @ 00:00)  HR: 98 (02-06-25 @ 04:30) (60 - 128)  BP: 138/82 (02-06-25 @ 04:30) (132/74 - 159/87)  RR: 18 (02-06-25 @ 04:30) (18 - 20)  SpO2: 98% (02-06-25 @ 04:30) (95% - 98%)  Wt(kg): --  Daily     Daily       Physical Exam:  Gen: NAD; Patient resting comfortably  HEENT:   Normocephalic/ atraumatic  CV: RRR, normal S1 + S2   Lungs:  Normal respiratory effort; clear to auscultation bilaterally  Abd: soft, non-tender; bowel sounds present  Ext: No edema; warm and well perfused    Telemetry: Sinus; brief bursts of a-fib    Laboratory Data:                        13.4   13.64 )-----------( 352      ( 06 Feb 2025 04:50 )             39.7     02-05    137  |  106  |  19  ----------------------------<  121[H]  4.0   |  17[L]  |  0.79    Ca    8.5      05 Feb 2025 07:05  Phos  3.1     02-05  Mg     2.10     02-05                Inpatient Medications:  MEDICATIONS  (STANDING):  dextrose 5% + sodium chloride 0.45%. 1000 milliLiter(s) (75 mL/Hr) IV Continuous <Continuous>  enoxaparin Injectable 40 milliGRAM(s) SubCutaneous every 24 hours  losartan 25 milliGRAM(s) Oral daily  metoprolol tartrate Injectable 5 milliGRAM(s) IV Push every 6 hours      Assessment: 89 year old man with HTN, hyperlipidemia, and advanced dementia presents with a-fib with RVR and COVID-19.     Plan of Care:    #A-fib with RVR-  The patient has brief bursts of a-fib, but is overall maintaining sinus rhythm.  Continue IV Lopressor as the patient cannot tolerate pills.   Given debility, fall risk, and advanced dementia, the risks of anticoagulation outweigh the benefits.    #COVID-19-  s/p remdesivir by the primary team.      #ACP (advance care planning)-  CPT 47554  Advanced care planning was discussed with the patient's daughter.  Advance care planning forms were discussed. Risks, benefits and alternatives of medical treatment and procedures were discussed in detail and all questions were answered. 30 additional minutes spent addressing advance care plans.      Over 55 minutes spent on total encounter; more than 50% of the visit was spent counseling and/or coordinating care by the attending physician.      Mello Kinney MD [facility]  Cardiovascular Disease  [phone]

## 2025-02-06 NOTE — DIETITIAN INITIAL EVALUATION ADULT - PERTINENT MEDS FT
MEDICATIONS  (STANDING):  dextrose 5% + sodium chloride 0.45%. 1000 milliLiter(s) (75 mL/Hr) IV Continuous <Continuous>  enoxaparin Injectable 40 milliGRAM(s) SubCutaneous every 24 hours  losartan 25 milliGRAM(s) Oral daily  metoprolol tartrate Injectable 5 milliGRAM(s) IV Push every 6 hours    MEDICATIONS  (PRN):

## 2025-02-06 NOTE — DIETITIAN INITIAL EVALUATION ADULT - PROBLEM SELECTOR PLAN 1
Patient fevering in the ED with AFib with RVR on admission.  Found to be COVID positive.  Meets sepsis criteria.  IMPROVING  - Remdesivir 200mg initial dose followed by 100mg daily x 5 days total  - Continue pip-tazo 3.375mg TID

## 2025-02-06 NOTE — DIETITIAN INITIAL EVALUATION ADULT - PERTINENT LABORATORY DATA
02-06    137  |  105  |  16  ----------------------------<  95  3.9   |  18[L]  |  0.82    Ca    8.5      06 Feb 2025 04:50  Phos  2.7     02-06  Mg     2.00     02-06

## 2025-02-06 NOTE — PROGRESS NOTE ADULT - PROBLEM SELECTOR PLAN 9
DVT ppx:  Lovenox    Dispo:  PT reeval if mental status improves.     Code Status:  DNR/DNI    POC d/w daughter 2/5

## 2025-02-06 NOTE — PROGRESS NOTE ADULT - SUBJECTIVE AND OBJECTIVE BOX
[facility] Division of Hospital Medicine  Ever Rodrigues MD  In House Pager [phone]    Patient is a 89y old  Male who presents with a chief complaint of AMS (06 Feb 2025 08:00)    OVERNIGHT EVENTS: no acute events.   SUBJECTIVE: no new subjective symptoms. remain encephalopathic, elevating WBC.   ROS: Denied Fever, Chill, CP, SOB, Abd pain, N/V/D, LE swelling or pain.     MEDICATIONS  (STANDING):  dextrose 5% + sodium chloride 0.45%. 1000 milliLiter(s) (75 mL/Hr) IV Continuous <Continuous>  enoxaparin Injectable 40 milliGRAM(s) SubCutaneous every 24 hours  losartan 25 milliGRAM(s) Oral daily  metoprolol tartrate Injectable 5 milliGRAM(s) IV Push every 6 hours    MEDICATIONS  (PRN):    CAPILLARY BLOOD GLUCOSE        I&O's Summary      Vital Signs Last 24 Hrs  T(C): 36.7 (06 Feb 2025 11:30), Max: 37.1 (06 Feb 2025 00:00)  T(F): 98.1 (06 Feb 2025 11:30), Max: 98.7 (06 Feb 2025 00:00)  HR: 66 (06 Feb 2025 11:30) (66 - 98)  BP: 134/76 (06 Feb 2025 11:30) (134/76 - 159/87)  BP(mean): --  RR: 20 (06 Feb 2025 11:30) (18 - 20)  SpO2: 98% (06 Feb 2025 11:30) (95% - 98%)    Parameters below as of 06 Feb 2025 11:30  Patient On (Oxygen Delivery Method): room air        LABS:                        13.4   13.64 )-----------( 352      ( 06 Feb 2025 04:50 )             39.7     02-06    137  |  105  |  16  ----------------------------<  95  3.9   |  18[L]  |  0.82    Ca    8.5      06 Feb 2025 04:50  Phos  2.7     02-06  Mg     2.00     02-06            Urinalysis Basic - ( 06 Feb 2025 04:50 )    Color: x / Appearance: x / SG: x / pH: x  Gluc: 95 mg/dL / Ketone: x  / Bili: x / Urobili: x   Blood: x / Protein: x / Nitrite: x   Leuk Esterase: x / RBC: x / WBC x   Sq Epi: x / Non Sq Epi: x / Bacteria: x        RADIOLOGY & ADDITIONAL TESTS:  Results Reviewed: Y  Imaging Personally Reviewed: Y  Electrocardiogram Personally Reviewed: Y    COORDINATION OF CARE:  Care Discussed with Consultants/Other Providers [Y/N]: Y  Prior or Outpatient Records Reviewed [Y/N]: Y

## 2025-02-06 NOTE — DIETITIAN INITIAL EVALUATION ADULT - OTHER INFO
Patient seen for length of stay. Patient exhibited difficulty chewing and swallowing per SLP evaluation on 2/1 with recommendation of short term means of nutrition and hydration. Noted diet order: puree and moderate thick liquids per family requested for pleasure feeds. Per chart review, PEG is not in line with family's decision; patient is DNI/DNR. Reports sub-optimal PO intake with poor appetite. Per discussion with family friend, willing to try Might Shake 4 oz. (200 kcal, 6 gms pro) and Magic Cup 4 oz. (290 kcal, 6 gms protein) on meal trays. No report of nausea, vomit, diarrhea, constipation. Patient with fecal incontinence; last BM documented 2/4 per RN flow sheets. Labs with acceptable ranges. Dosing weight noted questionable accuracy. Bed scale obtained today @ 62.5kg (2/6) which is likely closer to his baseline weight per [facility] record 58.5kg (9/16/22). History weight ranging 59-61kg. Nutrition education deferred at this time.  Patient seen for length of stay. Observed IV hydration running during visit. Patient exhibited difficulty chewing and swallowing per SLP evaluation on 2/1 with recommendation of short term means of nutrition and hydration. Noted diet order: puree and moderate thick liquids per family requested for pleasure feeds. Per chart review, PEG is not in line with family's decision; patient is DNI/DNR. Reports sub-optimal PO intake with poor appetite. Per discussion with family friend, willing to try Might Shake 4 oz. (200 kcal, 6 gms pro) and Magic Cup 4 oz. (290 kcal, 6 gms protein) on meal trays. No report of nausea, vomit, diarrhea, constipation. Patient with fecal incontinence; last BM documented 2/4 per RN flow sheets. Labs with acceptable ranges. Dosing weight noted questionable accuracy. Bed scale obtained today @ 62.5kg (2/6) which is likely closer to his baseline weight per [facility] record 58.5kg (9/16/22). History weight ranging 59-61kg. Nutrition education deferred at this time.

## 2025-02-07 LAB
ANION GAP SERPL CALC-SCNC: 13 MMOL/L — SIGNIFICANT CHANGE UP (ref 7–14)
BASOPHILS # BLD AUTO: 0.05 K/UL — SIGNIFICANT CHANGE UP (ref 0–0.2)
BASOPHILS NFR BLD AUTO: 0.4 % — SIGNIFICANT CHANGE UP (ref 0–2)
BUN SERPL-MCNC: 14 MG/DL — SIGNIFICANT CHANGE UP (ref 7–23)
CALCIUM SERPL-MCNC: 8.9 MG/DL — SIGNIFICANT CHANGE UP (ref 8.4–10.5)
CHLORIDE SERPL-SCNC: 105 MMOL/L — SIGNIFICANT CHANGE UP (ref 98–107)
CO2 SERPL-SCNC: 21 MMOL/L — LOW (ref 22–31)
CREAT SERPL-MCNC: 0.84 MG/DL — SIGNIFICANT CHANGE UP (ref 0.5–1.3)
EGFR: 84 ML/MIN/1.73M2 — SIGNIFICANT CHANGE UP
EOSINOPHIL # BLD AUTO: 0.34 K/UL — SIGNIFICANT CHANGE UP (ref 0–0.5)
EOSINOPHIL NFR BLD AUTO: 2.7 % — SIGNIFICANT CHANGE UP (ref 0–6)
GLUCOSE SERPL-MCNC: 84 MG/DL — SIGNIFICANT CHANGE UP (ref 70–99)
HCT VFR BLD CALC: 41.6 % — SIGNIFICANT CHANGE UP (ref 39–50)
HGB BLD-MCNC: 14.7 G/DL — SIGNIFICANT CHANGE UP (ref 13–17)
IANC: 9.61 K/UL — HIGH (ref 1.8–7.4)
IMM GRANULOCYTES NFR BLD AUTO: 0.6 % — SIGNIFICANT CHANGE UP (ref 0–0.9)
LYMPHOCYTES # BLD AUTO: 1.33 K/UL — SIGNIFICANT CHANGE UP (ref 1–3.3)
LYMPHOCYTES # BLD AUTO: 10.5 % — LOW (ref 13–44)
MAGNESIUM SERPL-MCNC: 2 MG/DL — SIGNIFICANT CHANGE UP (ref 1.6–2.6)
MCHC RBC-ENTMCNC: 31.2 PG — SIGNIFICANT CHANGE UP (ref 27–34)
MCHC RBC-ENTMCNC: 35.3 G/DL — SIGNIFICANT CHANGE UP (ref 32–36)
MCV RBC AUTO: 88.3 FL — SIGNIFICANT CHANGE UP (ref 80–100)
MONOCYTES # BLD AUTO: 1.22 K/UL — HIGH (ref 0–0.9)
MONOCYTES NFR BLD AUTO: 9.7 % — SIGNIFICANT CHANGE UP (ref 2–14)
NEUTROPHILS # BLD AUTO: 9.61 K/UL — HIGH (ref 1.8–7.4)
NEUTROPHILS NFR BLD AUTO: 76.1 % — SIGNIFICANT CHANGE UP (ref 43–77)
NRBC # BLD AUTO: 0 K/UL — SIGNIFICANT CHANGE UP (ref 0–0)
NRBC # BLD: 0 /100 WBCS — SIGNIFICANT CHANGE UP (ref 0–0)
NRBC # FLD: 0 K/UL — SIGNIFICANT CHANGE UP (ref 0–0)
NRBC BLD-RTO: 0 /100 WBCS — SIGNIFICANT CHANGE UP (ref 0–0)
PHOSPHATE SERPL-MCNC: 2.9 MG/DL — SIGNIFICANT CHANGE UP (ref 2.5–4.5)
PLATELET # BLD AUTO: 360 K/UL — SIGNIFICANT CHANGE UP (ref 150–400)
POTASSIUM SERPL-MCNC: 3.8 MMOL/L — SIGNIFICANT CHANGE UP (ref 3.5–5.3)
POTASSIUM SERPL-SCNC: 3.8 MMOL/L — SIGNIFICANT CHANGE UP (ref 3.5–5.3)
RBC # BLD: 4.71 M/UL — SIGNIFICANT CHANGE UP (ref 4.2–5.8)
RBC # FLD: 12.6 % — SIGNIFICANT CHANGE UP (ref 10.3–14.5)
SODIUM SERPL-SCNC: 139 MMOL/L — SIGNIFICANT CHANGE UP (ref 135–145)
WBC # BLD: 12.63 K/UL — HIGH (ref 3.8–10.5)
WBC # FLD AUTO: 12.63 K/UL — HIGH (ref 3.8–10.5)

## 2025-02-07 PROCEDURE — 99233 SBSQ HOSP IP/OBS HIGH 50: CPT

## 2025-02-07 RX ORDER — TOUCHLESS CARE ZINC OXIDE PROTECTANT 20; 25 G/100G; G/100G
1 SPRAY TOPICAL THREE TIMES A DAY
Refills: 0 | Status: DISCONTINUED | OUTPATIENT
Start: 2025-02-07 | End: 2025-02-17

## 2025-02-07 RX ORDER — METOPROLOL SUCCINATE 50 MG/1
25 TABLET, EXTENDED RELEASE ORAL
Refills: 0 | Status: DISCONTINUED | OUTPATIENT
Start: 2025-02-07 | End: 2025-02-10

## 2025-02-07 RX ORDER — TOUCHLESS CARE ZINC OXIDE PROTECTANT 20; 25 G/100G; G/100G
1 SPRAY TOPICAL THREE TIMES A DAY
Refills: 0 | Status: DISCONTINUED | OUTPATIENT
Start: 2025-02-07 | End: 2025-02-07

## 2025-02-07 RX ADMIN — METOPROLOL SUCCINATE 5 MILLIGRAM(S): 50 TABLET, EXTENDED RELEASE ORAL at 00:10

## 2025-02-07 RX ADMIN — METOPROLOL SUCCINATE 5 MILLIGRAM(S): 50 TABLET, EXTENDED RELEASE ORAL at 05:16

## 2025-02-07 RX ADMIN — ENOXAPARIN SODIUM 40 MILLIGRAM(S): 100 INJECTION SUBCUTANEOUS at 10:36

## 2025-02-07 RX ADMIN — TAMSULOSIN HYDROCHLORIDE 0.4 MILLIGRAM(S): 0.4 CAPSULE ORAL at 23:03

## 2025-02-07 RX ADMIN — FINASTERIDE 5 MILLIGRAM(S): 1 TABLET, FILM COATED ORAL at 11:30

## 2025-02-07 RX ADMIN — LOSARTAN POTASSIUM 25 MILLIGRAM(S): 100 TABLET, FILM COATED ORAL at 05:16

## 2025-02-07 RX ADMIN — Medication 40 MILLIEQUIVALENT(S): at 10:35

## 2025-02-07 RX ADMIN — METOPROLOL SUCCINATE 25 MILLIGRAM(S): 50 TABLET, EXTENDED RELEASE ORAL at 18:55

## 2025-02-07 RX ADMIN — MEMANTINE HYDROCHLORIDE 10 MILLIGRAM(S): 21 CAPSULE, EXTENDED RELEASE ORAL at 11:30

## 2025-02-07 NOTE — PROGRESS NOTE ADULT - PROBLEM SELECTOR PLAN 3
New Afib w/ RVR in setting of underlying sepsis, now w/ sinus bradycardia   - s/p digoxin load, hold digoxin   - Telemetry monitoring, intermittent tachycardia.   - defer anticoagulation given fall risk and advanced dementia  - cardiology input appreciated  - TTE showed nl LV function. grade 1 diastolic   - transition to PO metorpolol 25mg bid, can give IVP if patient not able to take PO.

## 2025-02-07 NOTE — PROGRESS NOTE ADULT - PROBLEM SELECTOR PLAN 8
- in setting of dementia   - failed speech and swallow  - spoke w/ daughter, not interested in PEG and would like to initiate pleasure feeds. Daughter understands risk of aspiration.   - pureed diet w/ moderate thickened liquids   - aspiration precautions  - encourage PO  - IVF for maintenance.

## 2025-02-07 NOTE — PROGRESS NOTE ADULT - SUBJECTIVE AND OBJECTIVE BOX
Cardiovascular Disease Progress Note  DATE OF SERVICE: 02-07-25 @ 11:52    Overnight events: No acute events overnight.    The patient is in no distress.   Otherwise review of systems negative    Objective Findings:  T(C): 36.6 (02-07-25 @ 08:00), Max: 37.3 (02-06-25 @ 20:30)  HR: 68 (02-07-25 @ 08:00) (65 - 90)  BP: 156/78 (02-07-25 @ 08:00) (134/71 - 156/78)  RR: 18 (02-07-25 @ 08:00) (18 - 18)  SpO2: 98% (02-07-25 @ 08:00) (98% - 100%)  Wt(kg): --  Daily     Daily       Physical Exam:  Gen: NAD; Patient resting comfortably  HEENT:  Normocephalic/ atraumatic  CV: RRR, normal S1 + S2, no m/r/g  Lungs:  Normal respiratory effort; clear to auscultation bilaterally  Abd: soft, non-tender; bowel sounds present  Ext: No edema; warm and well perfused    Telemetry: Sinus    Laboratory Data:                        14.7   12.63 )-----------( 360      ( 07 Feb 2025 05:40 )             41.6     02-07    139  |  105  |  14  ----------------------------<  84  3.8   |  21[L]  |  0.84    Ca    8.9      07 Feb 2025 05:40  Phos  2.9     02-07  Mg     2.00     02-07                Inpatient Medications:  MEDICATIONS  (STANDING):  dextrose 5% + sodium chloride 0.45%. 1000 milliLiter(s) (75 mL/Hr) IV Continuous <Continuous>  enoxaparin Injectable 40 milliGRAM(s) SubCutaneous every 24 hours  finasteride 5 milliGRAM(s) Oral daily  losartan 25 milliGRAM(s) Oral daily  memantine 10 milliGRAM(s) Oral daily  metoprolol tartrate 25 milliGRAM(s) Oral two times a day  tamsulosin 0.4 milliGRAM(s) Oral at bedtime  zinc oxide 40% Paste 1 Application(s) Topical three times a day      Assessment: 89 year old man with HTN, hyperlipidemia, and advanced dementia presents with a-fib with RVR and COVID-19.     Plan of Care:    #A-fib with RVR-  The patient has brief bursts of a-fib, but is overall maintaining sinus rhythm.  Oral Lopressor if the patient can  tolerate pills.   No acute findings on echo.   Given debility, fall risk, and advanced dementia, the risks of anticoagulation outweigh the benefits.    #COVID-19-  s/p remdesivir by the primary team.          Over 55 minutes spent on total encounter; more than 50% of the visit was spent counseling and/or coordinating care by the attending physician.      Mello Kinney MD [facility]  Cardiovascular Disease  [phone]

## 2025-02-07 NOTE — PROGRESS NOTE ADULT - SUBJECTIVE AND OBJECTIVE BOX
[facility] Division of Hospital Medicine  Ever Rodrigues MD  In House Pager [phone]    Patient is a 89y old  Male who presents with a chief complaint of AMS (07 Feb 2025 11:52)    OVERNIGHT EVENTS: no acute events.   SUBJECTIVE: no new subjective symptoms. more alert and following command with family at bedside.   ROS: unable to obtain due to mental status.     MEDICATIONS  (STANDING):  dextrose 5% + sodium chloride 0.45%. 1000 milliLiter(s) (75 mL/Hr) IV Continuous <Continuous>  enoxaparin Injectable 40 milliGRAM(s) SubCutaneous every 24 hours  finasteride 5 milliGRAM(s) Oral daily  losartan 25 milliGRAM(s) Oral daily  memantine 10 milliGRAM(s) Oral daily  metoprolol tartrate 25 milliGRAM(s) Oral two times a day  tamsulosin 0.4 milliGRAM(s) Oral at bedtime  zinc oxide 40% Paste 1 Application(s) Topical three times a day    MEDICATIONS  (PRN):    CAPILLARY BLOOD GLUCOSE        I&O's Summary      Vital Signs Last 24 Hrs  T(C): 36.6 (07 Feb 2025 12:00), Max: 37.3 (06 Feb 2025 20:30)  T(F): 97.8 (07 Feb 2025 12:00), Max: 99.1 (06 Feb 2025 20:30)  HR: 72 (07 Feb 2025 12:00) (65 - 90)  BP: 139/71 (07 Feb 2025 12:00) (134/71 - 156/78)  BP(mean): --  RR: 17 (07 Feb 2025 12:00) (17 - 18)  SpO2: 99% (07 Feb 2025 12:00) (98% - 100%)    Parameters below as of 07 Feb 2025 12:00  Patient On (Oxygen Delivery Method): room air        LABS:                        14.7   12.63 )-----------( 360      ( 07 Feb 2025 05:40 )             41.6     02-07    139  |  105  |  14  ----------------------------<  84  3.8   |  21[L]  |  0.84    Ca    8.9      07 Feb 2025 05:40  Phos  2.9     02-07  Mg     2.00     02-07            Urinalysis Basic - ( 07 Feb 2025 05:40 )    Color: x / Appearance: x / SG: x / pH: x  Gluc: 84 mg/dL / Ketone: x  / Bili: x / Urobili: x   Blood: x / Protein: x / Nitrite: x   Leuk Esterase: x / RBC: x / WBC x   Sq Epi: x / Non Sq Epi: x / Bacteria: x        RADIOLOGY & ADDITIONAL TESTS:  Results Reviewed: Y  Imaging Personally Reviewed: Y  Electrocardiogram Personally Reviewed: Y    COORDINATION OF CARE:  Care Discussed with Consultants/Other Providers [Y/N]: Y  Prior or Outpatient Records Reviewed [Y/N]: Y

## 2025-02-07 NOTE — PROGRESS NOTE ADULT - PROBLEM SELECTOR PLAN 2
Likely d/t COVID infection, has underlying dementia   - more alert  - management of COVID as above  - volume status improving.   - c/w IVF while having decreased PO intake.   - mental status is improving, repeat PT eval .

## 2025-02-08 LAB
ANION GAP SERPL CALC-SCNC: 14 MMOL/L — SIGNIFICANT CHANGE UP (ref 7–14)
BUN SERPL-MCNC: 11 MG/DL — SIGNIFICANT CHANGE UP (ref 7–23)
CALCIUM SERPL-MCNC: 8.8 MG/DL — SIGNIFICANT CHANGE UP (ref 8.4–10.5)
CHLORIDE SERPL-SCNC: 107 MMOL/L — SIGNIFICANT CHANGE UP (ref 98–107)
CO2 SERPL-SCNC: 18 MMOL/L — LOW (ref 22–31)
CREAT SERPL-MCNC: 0.84 MG/DL — SIGNIFICANT CHANGE UP (ref 0.5–1.3)
EGFR: 84 ML/MIN/1.73M2 — SIGNIFICANT CHANGE UP
GLUCOSE SERPL-MCNC: 94 MG/DL — SIGNIFICANT CHANGE UP (ref 70–99)
HCT VFR BLD CALC: 43 % — SIGNIFICANT CHANGE UP (ref 39–50)
HGB BLD-MCNC: 14.3 G/DL — SIGNIFICANT CHANGE UP (ref 13–17)
MAGNESIUM SERPL-MCNC: 2 MG/DL — SIGNIFICANT CHANGE UP (ref 1.6–2.6)
MCHC RBC-ENTMCNC: 30.4 PG — SIGNIFICANT CHANGE UP (ref 27–34)
MCHC RBC-ENTMCNC: 33.3 G/DL — SIGNIFICANT CHANGE UP (ref 32–36)
MCV RBC AUTO: 91.3 FL — SIGNIFICANT CHANGE UP (ref 80–100)
NRBC # BLD AUTO: 0 K/UL — SIGNIFICANT CHANGE UP (ref 0–0)
NRBC # BLD: 0 /100 WBCS — SIGNIFICANT CHANGE UP (ref 0–0)
NRBC # FLD: 0 K/UL — SIGNIFICANT CHANGE UP (ref 0–0)
NRBC BLD-RTO: 0 /100 WBCS — SIGNIFICANT CHANGE UP (ref 0–0)
PHOSPHATE SERPL-MCNC: 3.2 MG/DL — SIGNIFICANT CHANGE UP (ref 2.5–4.5)
PLATELET # BLD AUTO: 285 K/UL — SIGNIFICANT CHANGE UP (ref 150–400)
POTASSIUM SERPL-MCNC: 4.3 MMOL/L — SIGNIFICANT CHANGE UP (ref 3.5–5.3)
POTASSIUM SERPL-SCNC: 4.3 MMOL/L — SIGNIFICANT CHANGE UP (ref 3.5–5.3)
RBC # BLD: 4.71 M/UL — SIGNIFICANT CHANGE UP (ref 4.2–5.8)
RBC # FLD: 12.9 % — SIGNIFICANT CHANGE UP (ref 10.3–14.5)
SODIUM SERPL-SCNC: 139 MMOL/L — SIGNIFICANT CHANGE UP (ref 135–145)
WBC # BLD: 12.8 K/UL — HIGH (ref 3.8–10.5)
WBC # FLD AUTO: 12.8 K/UL — HIGH (ref 3.8–10.5)

## 2025-02-08 PROCEDURE — 99233 SBSQ HOSP IP/OBS HIGH 50: CPT

## 2025-02-08 RX ORDER — QUETIAPINE FUMARATE 25 MG/1
12.5 TABLET ORAL AT BEDTIME
Refills: 0 | Status: DISCONTINUED | OUTPATIENT
Start: 2025-02-08 | End: 2025-02-09

## 2025-02-08 RX ORDER — LOSARTAN POTASSIUM 100 MG/1
50 TABLET, FILM COATED ORAL DAILY
Refills: 0 | Status: DISCONTINUED | OUTPATIENT
Start: 2025-02-08 | End: 2025-02-14

## 2025-02-08 RX ORDER — LOSARTAN POTASSIUM 100 MG/1
25 TABLET, FILM COATED ORAL ONCE
Refills: 0 | Status: COMPLETED | OUTPATIENT
Start: 2025-02-08 | End: 2025-02-08

## 2025-02-08 RX ADMIN — ENOXAPARIN SODIUM 40 MILLIGRAM(S): 100 INJECTION SUBCUTANEOUS at 09:53

## 2025-02-08 RX ADMIN — FINASTERIDE 5 MILLIGRAM(S): 1 TABLET, FILM COATED ORAL at 09:52

## 2025-02-08 RX ADMIN — TOUCHLESS CARE ZINC OXIDE PROTECTANT 1 APPLICATION(S): 20; 25 SPRAY TOPICAL at 21:24

## 2025-02-08 RX ADMIN — LOSARTAN POTASSIUM 25 MILLIGRAM(S): 100 TABLET, FILM COATED ORAL at 07:38

## 2025-02-08 RX ADMIN — LOSARTAN POTASSIUM 25 MILLIGRAM(S): 100 TABLET, FILM COATED ORAL at 14:49

## 2025-02-08 RX ADMIN — METOPROLOL SUCCINATE 25 MILLIGRAM(S): 50 TABLET, EXTENDED RELEASE ORAL at 17:11

## 2025-02-08 RX ADMIN — METOPROLOL SUCCINATE 25 MILLIGRAM(S): 50 TABLET, EXTENDED RELEASE ORAL at 07:38

## 2025-02-08 RX ADMIN — MEMANTINE HYDROCHLORIDE 10 MILLIGRAM(S): 21 CAPSULE, EXTENDED RELEASE ORAL at 09:53

## 2025-02-08 RX ADMIN — TOUCHLESS CARE ZINC OXIDE PROTECTANT 1 APPLICATION(S): 20; 25 SPRAY TOPICAL at 13:27

## 2025-02-08 RX ADMIN — SODIUM CHLORIDE 75 MILLILITER(S): 9 INJECTION, SOLUTION INTRAVENOUS at 09:54

## 2025-02-08 RX ADMIN — TOUCHLESS CARE ZINC OXIDE PROTECTANT 1 APPLICATION(S): 20; 25 SPRAY TOPICAL at 07:40

## 2025-02-08 NOTE — PROGRESS NOTE ADULT - SUBJECTIVE AND OBJECTIVE BOX
[facility] Division of Hospital Medicine  Ever Rodrigues MD  In House Pager [phone]    Patient is a 89y old  Male who presents with a chief complaint of AMS (08 Feb 2025 09:16)    OVERNIGHT EVENTS: no acute events.   SUBJECTIVE: no new subjective symptoms. patient appears more confused today. less awake. daughter at bedside. was able to do some activity with PT earlier.   ROS: unable to obtain due to mental status.     MEDICATIONS  (STANDING):  dextrose 5% + sodium chloride 0.45%. 1000 milliLiter(s) (75 mL/Hr) IV Continuous <Continuous>  enoxaparin Injectable 40 milliGRAM(s) SubCutaneous every 24 hours  finasteride 5 milliGRAM(s) Oral daily  losartan 25 milliGRAM(s) Oral daily  memantine 10 milliGRAM(s) Oral daily  metoprolol tartrate 25 milliGRAM(s) Oral two times a day  QUEtiapine 12.5 milliGRAM(s) Oral at bedtime  tamsulosin 0.4 milliGRAM(s) Oral at bedtime  zinc oxide 20% Ointment 1 Application(s) Topical three times a day    MEDICATIONS  (PRN):    CAPILLARY BLOOD GLUCOSE        I&O's Summary    07 Feb 2025 07:01  -  08 Feb 2025 07:00  --------------------------------------------------------  IN: 300 mL / OUT: 300 mL / NET: 0 mL        Vital Signs Last 24 Hrs  T(C): 36.4 (08 Feb 2025 12:39), Max: 36.7 (07 Feb 2025 16:00)  T(F): 97.6 (08 Feb 2025 12:39), Max: 98 (07 Feb 2025 16:00)  HR: 56 (08 Feb 2025 12:39) (56 - 85)  BP: 175/64 (08 Feb 2025 12:39) (140/76 - 175/64)  BP(mean): --  RR: 18 (08 Feb 2025 12:39) (18 - 18)  SpO2: 100% (08 Feb 2025 12:39) (98% - 100%)    Parameters below as of 08 Feb 2025 12:39  Patient On (Oxygen Delivery Method): room air        LABS:                        14.3   12.80 )-----------( 285      ( 08 Feb 2025 07:00 )             43.0     02-08    139  |  107  |  11  ----------------------------<  94  4.3   |  18[L]  |  0.84    Ca    8.8      08 Feb 2025 07:00  Phos  3.2     02-08  Mg     2.00     02-08            Urinalysis Basic - ( 08 Feb 2025 07:00 )    Color: x / Appearance: x / SG: x / pH: x  Gluc: 94 mg/dL / Ketone: x  / Bili: x / Urobili: x   Blood: x / Protein: x / Nitrite: x   Leuk Esterase: x / RBC: x / WBC x   Sq Epi: x / Non Sq Epi: x / Bacteria: x        RADIOLOGY & ADDITIONAL TESTS:  Results Reviewed: Y  Imaging Personally Reviewed: Y  Electrocardiogram Personally Reviewed: Y    COORDINATION OF CARE:  Care Discussed with Consultants/Other Providers [Y/N]: Y  Prior or Outpatient Records Reviewed [Y/N]: Y

## 2025-02-08 NOTE — PHYSICAL THERAPY INITIAL EVALUATION ADULT - PLANNED THERAPY INTERVENTIONS, PT EVAL
Patient left positioned for safety in bed in NAD, call bell in reach, all lines intact. +bed alarm,/balance training/bed mobility training/gait training/strengthening/transfer training

## 2025-02-08 NOTE — PHYSICAL THERAPY INITIAL EVALUATION ADULT - PERTINENT HX OF CURRENT PROBLEM, REHAB EVAL
Pt is an 89 year old male who presented with altered mental status and weakness, found to be COVID 19 positive.
As per chart, patient is an 89 year old male with a PMHx of HTN, HLD, dementia, and prostate cancer BIBEMS for AMS and weakness. History provided by daughter Porsche as patient unable to provide it himself.  Patient fell at home the day prior to admission, and was found on the floor next to his bed with scrapes on his knees.  He was unable to stand on his own.  At that point he was brought to the ED.

## 2025-02-08 NOTE — PHYSICAL THERAPY INITIAL EVALUATION ADULT - GENERAL OBSERVATIONS, REHAB EVAL
Pt received semi-supine, +IV, +bed alarm, +monitor lines, all lines/tubes intact, NAD. HR:  76 beats per minute.  used throughout [id number], Ignacia, however Pt unable to interact with  due to confusion.
Pt found semi reclined in bed; HR 78bpm; [place] speaking, daughter at bedside and able to translate; spoke with EVER Daugherty, who advised patient may participate.

## 2025-02-08 NOTE — PHYSICAL THERAPY INITIAL EVALUATION ADULT - PRECAUTIONS/LIMITATIONS, REHAB EVAL
COVID+/aspiration precautions/fall precautions/isolation precautions
airborne for +COVID/aspiration precautions/fall precautions/isolation precautions

## 2025-02-08 NOTE — PHYSICAL THERAPY INITIAL EVALUATION ADULT - NSPTDISCHREC_GEN_A_CORE
TBD post trial PT sessions; daughter wants patient to go to subacute rehab because he was independently ambulating about 10 days ago.

## 2025-02-08 NOTE — PHYSICAL THERAPY INITIAL EVALUATION ADULT - MANUAL MUSCLE TESTING RESULTS, REHAB EVAL
altered mental status: bilateral Upper Extremity and Lower Extremity 3+/5/grossly assessed due to
Pt not following commands/not tested due to

## 2025-02-08 NOTE — PROGRESS NOTE ADULT - PROBLEM SELECTOR PLAN 9
DVT ppx:  Lovenox    Dispo:  PT reeval on going. Home vs [facility] pending PT rec. discussed with daughter. requested hospital bed if returning home.     Code Status:  DNR/DNI

## 2025-02-08 NOTE — PHYSICAL THERAPY INITIAL EVALUATION ADULT - ADDITIONAL COMMENTS
Patient lives in a private home with his family, 5-6 stairs to enter, +1 flight of stairs to his bedroom. prior to admission, pt was independently ambulating. Pt has 24/7 home health aides. Pt owns shower chair, grab bars. Patient lives in a private home with his family, 5-6 stairs to enter, +1 flight of stairs to his bedroom. prior to admission, pt was independently ambulating. Pt has 24/7 home health aides. Pt owns shower chair, grab bars. Pt's daughter at bedside reports that she wants her father to go to subacute rehab because he was independent 10 days ago. Pt's daughter reports patient has dementia but his confusion has increased recently.

## 2025-02-08 NOTE — PROGRESS NOTE ADULT - SUBJECTIVE AND OBJECTIVE BOX
Cardiovascular Disease Progress Note  DATE OF SERVICE: 02-08-25 @ 09:16    Overnight events: No acute events overnight.    The patient is in no distress.   Otherwise review of systems negative    Objective Findings:  T(C): 36.3 (02-08-25 @ 05:45), Max: 36.7 (02-07-25 @ 16:00)  HR: 76 (02-08-25 @ 05:45) (66 - 85)  BP: 167/82 (02-08-25 @ 05:45) (139/71 - 167/82)  RR: 18 (02-08-25 @ 05:45) (17 - 18)  SpO2: 98% (02-08-25 @ 05:45) (98% - 100%)  Wt(kg): --  Daily     Daily       Physical Exam:  Gen: NAD; Patient resting comfortably  HEENT:   Normocephalic/ atraumatic  CV: RRR, normal S1 + S2, no m/r/g  Lungs:  Normal respiratory effort; clear to auscultation bilaterally  Abd: soft, non-tender; bowel sounds present  Ext: No edema; warm and well perfused    Telemetry: Sinus    Laboratory Data:                        14.3   12.80 )-----------( 285      ( 08 Feb 2025 07:00 )             43.0     02-08    139  |  107  |  11  ----------------------------<  94  4.3   |  18[L]  |  0.84    Ca    8.8      08 Feb 2025 07:00  Phos  3.2     02-08  Mg     2.00     02-08                Inpatient Medications:  MEDICATIONS  (STANDING):  dextrose 5% + sodium chloride 0.45%. 1000 milliLiter(s) (75 mL/Hr) IV Continuous <Continuous>  enoxaparin Injectable 40 milliGRAM(s) SubCutaneous every 24 hours  finasteride 5 milliGRAM(s) Oral daily  losartan 25 milliGRAM(s) Oral daily  memantine 10 milliGRAM(s) Oral daily  metoprolol tartrate 25 milliGRAM(s) Oral two times a day  tamsulosin 0.4 milliGRAM(s) Oral at bedtime  zinc oxide 20% Ointment 1 Application(s) Topical three times a day      Assessment: 89 year old man with HTN, hyperlipidemia, and advanced dementia presents with a-fib with RVR and COVID-19.     Plan of Care:    #A-fib with RVR-  The patient has brief bursts of a-fib, but is overall maintaining sinus rhythm.  Oral Lopressor if the patient can  tolerate pills.   No acute findings on echo.   Given debility, fall risk, and advanced dementia, the risks of anticoagulation outweigh the benefits.    #COVID-19-  s/p remdesivir by the primary team.           Over 55 minutes spent on total encounter; more than 50% of the visit was spent counseling and/or coordinating care by the attending physician.      Mello Kinney MD [facility]  Cardiovascular Disease  [phone]

## 2025-02-09 PROCEDURE — 99233 SBSQ HOSP IP/OBS HIGH 50: CPT

## 2025-02-09 RX ORDER — METOPROLOL SUCCINATE 50 MG/1
2.5 TABLET, EXTENDED RELEASE ORAL ONCE
Refills: 0 | Status: COMPLETED | OUTPATIENT
Start: 2025-02-09 | End: 2025-02-09

## 2025-02-09 RX ADMIN — TOUCHLESS CARE ZINC OXIDE PROTECTANT 1 APPLICATION(S): 20; 25 SPRAY TOPICAL at 21:44

## 2025-02-09 RX ADMIN — METOPROLOL SUCCINATE 2.5 MILLIGRAM(S): 50 TABLET, EXTENDED RELEASE ORAL at 01:17

## 2025-02-09 RX ADMIN — TAMSULOSIN HYDROCHLORIDE 0.4 MILLIGRAM(S): 0.4 CAPSULE ORAL at 21:44

## 2025-02-09 RX ADMIN — LOSARTAN POTASSIUM 50 MILLIGRAM(S): 100 TABLET, FILM COATED ORAL at 14:48

## 2025-02-09 RX ADMIN — METOPROLOL SUCCINATE 2.5 MILLIGRAM(S): 50 TABLET, EXTENDED RELEASE ORAL at 05:26

## 2025-02-09 RX ADMIN — TOUCHLESS CARE ZINC OXIDE PROTECTANT 1 APPLICATION(S): 20; 25 SPRAY TOPICAL at 05:26

## 2025-02-09 RX ADMIN — SODIUM CHLORIDE 75 MILLILITER(S): 9 INJECTION, SOLUTION INTRAVENOUS at 21:44

## 2025-02-09 RX ADMIN — ENOXAPARIN SODIUM 40 MILLIGRAM(S): 100 INJECTION SUBCUTANEOUS at 14:37

## 2025-02-09 RX ADMIN — MEMANTINE HYDROCHLORIDE 10 MILLIGRAM(S): 21 CAPSULE, EXTENDED RELEASE ORAL at 14:48

## 2025-02-09 RX ADMIN — TOUCHLESS CARE ZINC OXIDE PROTECTANT 1 APPLICATION(S): 20; 25 SPRAY TOPICAL at 14:49

## 2025-02-09 RX ADMIN — METOPROLOL SUCCINATE 25 MILLIGRAM(S): 50 TABLET, EXTENDED RELEASE ORAL at 17:48

## 2025-02-09 RX ADMIN — FINASTERIDE 5 MILLIGRAM(S): 1 TABLET, FILM COATED ORAL at 14:48

## 2025-02-09 NOTE — DISCHARGE NOTE PROVIDER - HOSPITAL COURSE
HPI:  Mr Eric Mckenna is an 89 year old male with a PMHx of HTN, HLD, dementia, and prostate cancer BIBEMS for AMS and weakness. History provided by daughter Porsche as patient unable to provide it himself.  Patient fell at home the day prior to admission, and this morning was found on the floor next to his bed with scrapes on his knees.  He was unable to stand on his own.  At that point he was brought to the ED.  Notably, his other daughter was caring for him at the time and she tested positive for COVID 19.    On interview patient was muttering to himself and not attentive to interviewer.    Normally he cannot speak in complete sentences, has hallucinations, sometimes has some tremors.  Occasionally recognizes family members.    In the ED, patient was febrile and found to have AFib with RVR.  COVID positive.  Was given 2.5L crystalloid, IV Tylenol, and 20mg diltiazem with improvement in RVR   (30 Jan 2025 02:13)    Hospital Course:  Patient was admitted for management of COVID. During hospitalization, his HR has been controlled with metoprolol. He remain encephalopathic but slowly improves with maintenance IVF. His echo showed nl LV function. His mental status is now more alert and able to follow some instructions. awaiting further PT re-eval to determine dispo.     Important Medication Changes and Reason:      Active or Pending Issues Requiring Follow-up:      Advanced Directives:   [ ] Full code  [ ] DNR  [ ] Hospice    Discharge Diagnoses:   HPI:  Mr Eric Mckenna is an 89 year old male with a PMHx of HTN, HLD, dementia, and prostate cancer BIBEMS for AMS and weakness. History provided by daughter Porsche as patient unable to provide it himself.  Patient fell at home the day prior to admission, and this morning was found on the floor next to his bed with scrapes on his knees.  He was unable to stand on his own.  At that point he was brought to the ED.  Notably, his other daughter was caring for him at the time and she tested positive for COVID 19.    On interview patient was muttering to himself and not attentive to interviewer.    Normally he cannot speak in complete sentences, has hallucinations, sometimes has some tremors.  Occasionally recognizes family members.    In the ED, patient was febrile and found to have AFib with RVR.  COVID positive.  Was given 2.5L crystalloid, IV Tylenol, and 20mg diltiazem with improvement in RVR   (30 Jan 2025 02:13)    Hospital Course:  Patient was admitted for management of COVID. During hospitalization, his HR has been controlled with metoprolol. He remain encephalopathic but slowly improves with maintenance IVF. His echo showed nl LV function. His mental status is now more alert and able to follow some instructions. PO intake wax and wanes. family opt for hospice care and pleasure feeds        89 year old male with a PMHx of HTN, HLD, dementia, and prostate cancer BIBEMS for AMS and weakness, found to be COVID 19 positive, also w/ new onset Afib w/ RVR. mental status improving. PO intake wax and wanes. family opt for hospice care.     ·  Problem: Toxic metabolic encephalopathy.   ·  Plan: Likely d/t COVID infection w/ hypoactive delirium and underlying dementia   - management of COVID as below   - s/p IVF   - remains confused, not taking much PO  - plan for hospice.    ·  Problem: Atrial fibrillation.   ·  Plan: New Afib w/ RVR in setting of underlying sepsis  - HR mostly controlled   - TTE showed nl LV function. grade 1 diastolic   - s/p digoxin load, now holding   - on Metoprolol 5mg IV q6h, transition to PO on DC    - DC telemetry   - defer anticoagulation given fall risk and advanced dementia  - cardiology input appreciated.    ·  Problem: Viral sepsis POA d/t COVID infection, sepsis resolved   ·  Plan: - s/p Remdesivir   - CXR w/ clear lungs   - blood cultures NGTD   - procalcitonin 0.19     ·  Problem: Dementia.   ·  Plan: - discontinued buproprion and memantine.    ·  Problem: HTN (hypertension).   ·  Plan: - DC losartan (not taking)  - c/w Metoprolol   - monitor BP.    ·  Problem: BPH (benign prostatic hyperplasia).   ·  Plan: - c/w tamsulosin as tolerated.    ·  Problem: HLD (hyperlipidemia).   ·  Plan: - DC statin (not taking).    ·  Problem: Dysphagia.   ·  Plan: - in setting of dementia   - failed speech and swallow  - spoke w/ daughter, not interested in PEG and would like to initiate pleasure feeds. Daughter understands risk of aspiration.   - on pureed diet w/ moderate thickened liquids, but patient w/ poor PO intake    - aspiration precautions  - assistance w/ meals.     Problem/Plan - 7:  ·  Problem: Severe protein calorie malnutrition.   ·  Plan: - Encourage PO intake, assistance w/ feeding     DC home w/ hospice, equipment delivered

## 2025-02-09 NOTE — DISCHARGE NOTE PROVIDER - DETAILS OF MALNUTRITION DIAGNOSIS/DIAGNOSES
This patient has been assessed with a concern for Malnutrition and was treated during this hospitalization for the following Nutrition diagnosis/diagnoses:     -  02/06/2025: Severe protein-calorie malnutrition

## 2025-02-09 NOTE — DISCHARGE NOTE PROVIDER - CARE PROVIDER_API CALL
Fred Gomez  Cardiology  [address] [place] [address], [address]  [place] [address]  Phone: [phone]  Fax: [phone]  Follow Up Time:

## 2025-02-09 NOTE — DISCHARGE NOTE PROVIDER - NSDCCPCAREPLAN_GEN_ALL_CORE_FT
PRINCIPAL DISCHARGE DIAGNOSIS  Diagnosis: COVID  Assessment and Plan of Treatment: you were treated and this has resolved.      SECONDARY DISCHARGE DIAGNOSES  Diagnosis: AF (atrial fibrillation)  Assessment and Plan of Treatment: Please continue your medications as directed and follow-up with your primary provider/cardiologist to further manage your care. Monitor for signs/symptoms of uncontrolled atrial fibrillation such as, increased heart rate, palpitations, chest pain, dizziness, or shortness of breath - Return to emergency room if these signs/symptoms are present.    Diagnosis: Dementia  Assessment and Plan of Treatment: continue medication as prescribed. follow up with PCP.    Diagnosis: HTN (hypertension)  Assessment and Plan of Treatment: Continue blood pressure medication regimen as directed. Monitor for any visual changes, headaches or dizziness.  Monitor blood pressure regularly.  Follow up with your PCP for further management for high blood pressure.     PRINCIPAL DISCHARGE DIAGNOSIS  Diagnosis: COVID  Assessment and Plan of Treatment: Completed course of Remdesivir and acute infection resolved.      SECONDARY DISCHARGE DIAGNOSES  Diagnosis: AF (atrial fibrillation)  Assessment and Plan of Treatment: Continue taking Metoprolol as able. Can crush medication and give with food.    Diagnosis: Dementia  Assessment and Plan of Treatment: Discontinued buproprion and memantine as it will not change long term prognosis.    Diagnosis: HTN (hypertension)  Assessment and Plan of Treatment: Continue Metoprolol. Can discontinue Losartan.    Diagnosis: BPH (benign prostatic hyperplasia)  Assessment and Plan of Treatment: Continue Flomax as tolerated.

## 2025-02-09 NOTE — DISCHARGE NOTE PROVIDER - NSDCMRMEDTOKEN_GEN_ALL_CORE_FT
aspirin 81 mg oral tablet: 1 tab(s) orally once a day  buPROPion 300 mg/24 hours (XL) oral tablet, extended release: 1 tab(s) orally every 24 hours  finasteride 5 mg oral tablet: 1 tab(s) orally once a day  losartan 50 mg oral tablet: 1 tab(s) orally once a day  memantine 10 mg oral tablet: 1 tab(s) orally once a day  simvastatin 40 mg oral tablet: 1 tab(s) orally once a day (at bedtime)  tamsulosin 0.4 mg oral capsule: 1 cap(s) orally once a day   tamsulosin 0.4 mg oral capsule: 1 cap(s) orally once a day  zinc oxide 20% topical ointment: 1 Apply topically to affected area 3 times a day   Metoprolol Tartrate 25 mg oral tablet: 1 tab(s) orally 2 times a day may crush with food  tamsulosin 0.4 mg oral capsule: 1 cap(s) orally once a day  zinc oxide 20% topical ointment: 1 Apply topically to affected area 3 times a day

## 2025-02-09 NOTE — PROGRESS NOTE ADULT - SUBJECTIVE AND OBJECTIVE BOX
Cardiovascular Disease Progress Note  DATE OF SERVICE: 02-09-25 @ 09:54    Overnight events: No acute events overnight.    The patient is in no distress.   Otherwise review of systems negative    Objective Findings:  T(C): 36.3 (02-09-25 @ 09:00), Max: 37 (02-09-25 @ 01:00)  HR: 62 (02-09-25 @ 09:00) (56 - 150)  BP: 141/67 (02-09-25 @ 09:00) (118/76 - 175/64)  RR: 18 (02-09-25 @ 09:00) (18 - 18)  SpO2: 98% (02-09-25 @ 09:00) (97% - 100%)  Wt(kg): --  Daily     Daily       Physical Exam:  Gen: NAD; Patient resting comfortably  HEENT:  Normocephalic/ atraumatic  CV: RRR, normal S1 + S2   Lungs:  Normal respiratory effort; clear to auscultation bilaterally  Abd: soft, non-tender; bowel sounds present  Ext: No edema; warm and well perfused    Telemetry: Sinus    Laboratory Data:                        14.3   12.80 )-----------( 285      ( 08 Feb 2025 07:00 )             43.0     02-08    139  |  107  |  11  ----------------------------<  94  4.3   |  18[L]  |  0.84    Ca    8.8      08 Feb 2025 07:00  Phos  3.2     02-08  Mg     2.00     02-08                Inpatient Medications:  MEDICATIONS  (STANDING):  dextrose 5% + sodium chloride 0.45%. 1000 milliLiter(s) (75 mL/Hr) IV Continuous <Continuous>  enoxaparin Injectable 40 milliGRAM(s) SubCutaneous every 24 hours  finasteride 5 milliGRAM(s) Oral daily  losartan 50 milliGRAM(s) Oral daily  memantine 10 milliGRAM(s) Oral daily  metoprolol tartrate 25 milliGRAM(s) Oral two times a day  QUEtiapine 12.5 milliGRAM(s) Oral at bedtime  tamsulosin 0.4 milliGRAM(s) Oral at bedtime  zinc oxide 20% Ointment 1 Application(s) Topical three times a day      Assessment: 89 year old man with HTN, hyperlipidemia, and advanced dementia presents with a-fib with RVR and COVID-19.     Plan of Care:    #A-fib with RVR-  The patient has brief bursts of a-fib, but is overall maintaining sinus rhythm.  Oral Lopressor if the patient can PO pills.   No acute findings on echo.   Given debility, fall risk, and advanced dementia, the risks of anticoagulation outweigh the benefits.    #COVID-19-  s/p remdesivir by the primary team.    #ACP (advance care planning)-  CPT 10466  Advanced care planning was addressed.  Advance care planning forms were discussed. Risks, benefits and alternatives of medical treatment and procedures were discussed in detail and all questions were answered. 30 additional minutes spent addressing advance care plans.      Over 55 minutes spent on total encounter; 100% of the visit was spent counseling and/or coordinating care by the attending physician.      Mello Kinney MD [facility]  Cardiovascular Disease  [phone]

## 2025-02-09 NOTE — PROGRESS NOTE ADULT - SUBJECTIVE AND OBJECTIVE BOX
[facility] Division of Hospital Medicine  Ever Rodrigues MD  In House Pager [phone]    Patient is a 89y old  Male who presents with a chief complaint of AMS (08 Feb 2025 13:41)    OVERNIGHT EVENTS: no acute events.   SUBJECTIVE: no new subjective symptoms.   ROS: unable to obtain due to mental status    MEDICATIONS  (STANDING):  dextrose 5% + sodium chloride 0.45%. 1000 milliLiter(s) (75 mL/Hr) IV Continuous <Continuous>  enoxaparin Injectable 40 milliGRAM(s) SubCutaneous every 24 hours  finasteride 5 milliGRAM(s) Oral daily  losartan 50 milliGRAM(s) Oral daily  memantine 10 milliGRAM(s) Oral daily  metoprolol tartrate 25 milliGRAM(s) Oral two times a day  QUEtiapine 12.5 milliGRAM(s) Oral at bedtime  tamsulosin 0.4 milliGRAM(s) Oral at bedtime  zinc oxide 20% Ointment 1 Application(s) Topical three times a day    MEDICATIONS  (PRN):    CAPILLARY BLOOD GLUCOSE        I&O's Summary      Vital Signs Last 24 Hrs  T(C): 36.7 (09 Feb 2025 05:00), Max: 37 (09 Feb 2025 01:00)  T(F): 98 (09 Feb 2025 05:00), Max: 98.6 (09 Feb 2025 01:00)  HR: 75 (09 Feb 2025 05:00) (56 - 150)  BP: 146/71 (09 Feb 2025 05:00) (118/76 - 175/64)  BP(mean): --  RR: 18 (09 Feb 2025 05:00) (18 - 18)  SpO2: 98% (09 Feb 2025 05:00) (97% - 100%)    Parameters below as of 09 Feb 2025 05:00  Patient On (Oxygen Delivery Method): room air        LABS:                        14.3   12.80 )-----------( 285      ( 08 Feb 2025 07:00 )             43.0     02-08    139  |  107  |  11  ----------------------------<  94  4.3   |  18[L]  |  0.84    Ca    8.8      08 Feb 2025 07:00  Phos  3.2     02-08  Mg     2.00     02-08            Urinalysis Basic - ( 08 Feb 2025 07:00 )    Color: x / Appearance: x / SG: x / pH: x  Gluc: 94 mg/dL / Ketone: x  / Bili: x / Urobili: x   Blood: x / Protein: x / Nitrite: x   Leuk Esterase: x / RBC: x / WBC x   Sq Epi: x / Non Sq Epi: x / Bacteria: x        RADIOLOGY & ADDITIONAL TESTS:  Results Reviewed: Y  Imaging Personally Reviewed: Y  Electrocardiogram Personally Reviewed: Y    COORDINATION OF CARE:  Care Discussed with Consultants/Other Providers [Y/N]: Y  Prior or Outpatient Records Reviewed [Y/N]: Y

## 2025-02-10 LAB
ANION GAP SERPL CALC-SCNC: 14 MMOL/L — SIGNIFICANT CHANGE UP (ref 7–14)
BUN SERPL-MCNC: 12 MG/DL — SIGNIFICANT CHANGE UP (ref 7–23)
CALCIUM SERPL-MCNC: 9.1 MG/DL — SIGNIFICANT CHANGE UP (ref 8.4–10.5)
CHLORIDE SERPL-SCNC: 103 MMOL/L — SIGNIFICANT CHANGE UP (ref 98–107)
CO2 SERPL-SCNC: 20 MMOL/L — LOW (ref 22–31)
CREAT SERPL-MCNC: 0.88 MG/DL — SIGNIFICANT CHANGE UP (ref 0.5–1.3)
EGFR: 83 ML/MIN/1.73M2 — SIGNIFICANT CHANGE UP
GLUCOSE SERPL-MCNC: 78 MG/DL — SIGNIFICANT CHANGE UP (ref 70–99)
HCT VFR BLD CALC: 41.1 % — SIGNIFICANT CHANGE UP (ref 39–50)
HGB BLD-MCNC: 14.4 G/DL — SIGNIFICANT CHANGE UP (ref 13–17)
MAGNESIUM SERPL-MCNC: 2 MG/DL — SIGNIFICANT CHANGE UP (ref 1.6–2.6)
MCHC RBC-ENTMCNC: 31.2 PG — SIGNIFICANT CHANGE UP (ref 27–34)
MCHC RBC-ENTMCNC: 35 G/DL — SIGNIFICANT CHANGE UP (ref 32–36)
MCV RBC AUTO: 89.2 FL — SIGNIFICANT CHANGE UP (ref 80–100)
NRBC # BLD AUTO: 0 K/UL — SIGNIFICANT CHANGE UP (ref 0–0)
NRBC # BLD: 0 /100 WBCS — SIGNIFICANT CHANGE UP (ref 0–0)
NRBC # FLD: 0 K/UL — SIGNIFICANT CHANGE UP (ref 0–0)
NRBC BLD-RTO: 0 /100 WBCS — SIGNIFICANT CHANGE UP (ref 0–0)
PHOSPHATE SERPL-MCNC: 3.5 MG/DL — SIGNIFICANT CHANGE UP (ref 2.5–4.5)
PLATELET # BLD AUTO: 335 K/UL — SIGNIFICANT CHANGE UP (ref 150–400)
POTASSIUM SERPL-MCNC: 4.4 MMOL/L — SIGNIFICANT CHANGE UP (ref 3.5–5.3)
POTASSIUM SERPL-SCNC: 4.4 MMOL/L — SIGNIFICANT CHANGE UP (ref 3.5–5.3)
RBC # BLD: 4.61 M/UL — SIGNIFICANT CHANGE UP (ref 4.2–5.8)
RBC # FLD: 12.7 % — SIGNIFICANT CHANGE UP (ref 10.3–14.5)
SODIUM SERPL-SCNC: 137 MMOL/L — SIGNIFICANT CHANGE UP (ref 135–145)
WBC # BLD: 8.3 K/UL — SIGNIFICANT CHANGE UP (ref 3.8–10.5)
WBC # FLD AUTO: 8.3 K/UL — SIGNIFICANT CHANGE UP (ref 3.8–10.5)

## 2025-02-10 PROCEDURE — 99232 SBSQ HOSP IP/OBS MODERATE 35: CPT

## 2025-02-10 RX ORDER — ATORVASTATIN CALCIUM 80 MG/1
20 TABLET, FILM COATED ORAL AT BEDTIME
Refills: 0 | Status: DISCONTINUED | OUTPATIENT
Start: 2025-02-10 | End: 2025-02-14

## 2025-02-10 RX ORDER — METOPROLOL SUCCINATE 50 MG/1
5 TABLET, EXTENDED RELEASE ORAL ONCE
Refills: 0 | Status: COMPLETED | OUTPATIENT
Start: 2025-02-10 | End: 2025-02-10

## 2025-02-10 RX ORDER — METOPROLOL SUCCINATE 50 MG/1
5 TABLET, EXTENDED RELEASE ORAL EVERY 6 HOURS
Refills: 0 | Status: DISCONTINUED | OUTPATIENT
Start: 2025-02-10 | End: 2025-02-17

## 2025-02-10 RX ADMIN — LOSARTAN POTASSIUM 50 MILLIGRAM(S): 100 TABLET, FILM COATED ORAL at 10:28

## 2025-02-10 RX ADMIN — ENOXAPARIN SODIUM 40 MILLIGRAM(S): 100 INJECTION SUBCUTANEOUS at 10:29

## 2025-02-10 RX ADMIN — METOPROLOL SUCCINATE 5 MILLIGRAM(S): 50 TABLET, EXTENDED RELEASE ORAL at 17:12

## 2025-02-10 RX ADMIN — METOPROLOL SUCCINATE 5 MILLIGRAM(S): 50 TABLET, EXTENDED RELEASE ORAL at 15:22

## 2025-02-10 RX ADMIN — METOPROLOL SUCCINATE 5 MILLIGRAM(S): 50 TABLET, EXTENDED RELEASE ORAL at 23:34

## 2025-02-10 RX ADMIN — FINASTERIDE 5 MILLIGRAM(S): 1 TABLET, FILM COATED ORAL at 10:28

## 2025-02-10 RX ADMIN — TOUCHLESS CARE ZINC OXIDE PROTECTANT 1 APPLICATION(S): 20; 25 SPRAY TOPICAL at 05:45

## 2025-02-10 RX ADMIN — METOPROLOL SUCCINATE 25 MILLIGRAM(S): 50 TABLET, EXTENDED RELEASE ORAL at 10:28

## 2025-02-10 RX ADMIN — SODIUM CHLORIDE 75 MILLILITER(S): 9 INJECTION, SOLUTION INTRAVENOUS at 13:39

## 2025-02-10 RX ADMIN — TOUCHLESS CARE ZINC OXIDE PROTECTANT 1 APPLICATION(S): 20; 25 SPRAY TOPICAL at 21:35

## 2025-02-10 RX ADMIN — TOUCHLESS CARE ZINC OXIDE PROTECTANT 1 APPLICATION(S): 20; 25 SPRAY TOPICAL at 10:29

## 2025-02-10 RX ADMIN — MEMANTINE HYDROCHLORIDE 10 MILLIGRAM(S): 21 CAPSULE, EXTENDED RELEASE ORAL at 10:29

## 2025-02-10 NOTE — PATIENT PROFILE ADULT - HAVE YOU BEEN EATING POORLY BECAUSE OF A DECREASED APPETITE?
What Type Of Note Output Would You Prefer (Optional)?: Standard Output How Severe Is Your Skin Lesion?: moderate Is This A New Presentation, Or A Follow-Up?: Skin Lesion No (0)

## 2025-02-10 NOTE — PATIENT PROFILE ADULT - FUNCTIONAL ASSESSMENT - DAILY ACTIVITY 4.
Patient states that he has already picked up his prescription of Eliquis at the pharmacy. He is scheduled to see Dr Vogel in [facility].    1 = Total assistance

## 2025-02-10 NOTE — PROGRESS NOTE ADULT - PROBLEM SELECTOR PLAN 9
DVT ppx:  Lovenox    Dispo:  PT re-eval ongoing, patient unlikely to qualify for rehab     Code Status:  DNR/DNI    POC d/w daughter, requesting hospital bed if going home

## 2025-02-10 NOTE — PROGRESS NOTE ADULT - PROBLEM SELECTOR PLAN 3
New Afib w/ RVR in setting of underlying sepsis, now mostly sinus   - TTE showed nl LV function. grade 1 diastolic   - s/p digoxin load, hold digoxin   - transitioned to PO Metoprolol 25mg bid, can give IVP if patient not able to take PO.  - Telemetry monitoring  - defer anticoagulation given fall risk and advanced dementia  - cardiology input appreciated

## 2025-02-10 NOTE — PATIENT PROFILE ADULT - FALL HARM RISK - UNIVERSAL INTERVENTIONS
Bed in lowest position, wheels locked, appropriate side rails in place/Call bell, personal items and telephone in reach/Instruct patient to call for assistance before getting out of bed or chair/Non-slip footwear when patient is out of bed/[place] to call system/Physically safe environment - no spills, clutter or unnecessary equipment/Purposeful Proactive Rounding/Room/bathroom lighting operational, light cord in reach

## 2025-02-10 NOTE — CHART NOTE - NSCHARTNOTEFT_GEN_A_CORE
not tolerating po medications.  hr 150's s/p lopressor 5mg IV x 1 dose. no improvement of HR 2nd dose of 5mg ordered.  will d/c oral metoprolol and change to IV q6H

## 2025-02-10 NOTE — PROGRESS NOTE ADULT - PROBLEM SELECTOR PLAN 2
Likely d/t COVID infection, has underlying dementia   - management of COVID as above  - s/p IVF   - remains confused, unable to follow commands and not taking PO

## 2025-02-10 NOTE — PROGRESS NOTE ADULT - PROBLEM SELECTOR PLAN 8
- in setting of dementia   - failed speech and swallow  - spoke w/ daughter, not interested in PEG and would like to initiate pleasure feeds. Daughter understands risk of aspiration.   - pureed diet w/ moderate thickened liquids   - aspiration precautions  - assistance w/ meals

## 2025-02-10 NOTE — PROGRESS NOTE ADULT - SUBJECTIVE AND OBJECTIVE BOX
Cardiovascular Disease Progress Note  DATE OF SERVICE: 02-10-25 @ 08:59    Overnight events: No acute events overnight.    The patient is in no distress.   Otherwise review of systems negative    Objective Findings:  T(C): 36.7 (02-10-25 @ 04:30), Max: 36.7 (02-09-25 @ 14:35)  HR: 68 (02-10-25 @ 04:30) (60 - 139)  BP: 147/79 (02-10-25 @ 04:30) (117/75 - 154/92)  RR: 18 (02-10-25 @ 04:30) (18 - 18)  SpO2: 98% (02-10-25 @ 04:30) (98% - 100%)  Wt(kg): --  Daily     Daily       Physical Exam:  Gen: NAD; Patient resting comfortably  HEENT: EOMI, Normocephalic/ atraumatic  CV:   normal S1 + S2, no m/r/g  Lungs:  Normal respiratory effort; clear to auscultation bilaterally  Abd: soft, non-tender; bowel sounds present  Ext: No edema; warm and well perfused    Telemetry: Sinus    Laboratory Data:                        14.4   8.30  )-----------( 335      ( 10 Feb 2025 06:00 )             41.1     02-10    137  |  103  |  12  ----------------------------<  78  4.4   |  20[L]  |  0.88    Ca    9.1      10 Feb 2025 06:00  Phos  3.5     02-10  Mg     2.00     02-10                Inpatient Medications:  MEDICATIONS  (STANDING):  dextrose 5% + sodium chloride 0.45%. 1000 milliLiter(s) (75 mL/Hr) IV Continuous <Continuous>  enoxaparin Injectable 40 milliGRAM(s) SubCutaneous every 24 hours  finasteride 5 milliGRAM(s) Oral daily  losartan 50 milliGRAM(s) Oral daily  memantine 10 milliGRAM(s) Oral daily  metoprolol tartrate 25 milliGRAM(s) Oral two times a day  tamsulosin 0.4 milliGRAM(s) Oral at bedtime  zinc oxide 20% Ointment 1 Application(s) Topical three times a day      Assessment: 89 year old man with HTN, hyperlipidemia, and advanced dementia presents with a-fib with RVR and COVID-19.     Plan of Care:    #A-fib with RVR-  The patient is overall maintaining sinus rhythm with brief bursts of a-fib.  Continue AV yeyo blockers as ordered.   No acute findings on echo.   Given debility, fall risk, and advanced dementia, the risks of anticoagulation outweigh the benefits.    #COVID-19-  s/p remdesivir by the primary team.       Over 55 minutes spent on total encounter; 100% of the visit was spent counseling and/or coordinating care by the attending physician.      Mello Kinney MD [facility]  Cardiovascular Disease  [phone]

## 2025-02-10 NOTE — PROGRESS NOTE ADULT - SUBJECTIVE AND OBJECTIVE BOX
PROGRESS NOTE:     Patient is a 89y old  Male who presents with a chief complaint of AMS (10 Feb 2025 08:58)    SUBJECTIVE / OVERNIGHT EVENTS: pt not eating, spitting up meds.     ADDITIONAL REVIEW OF SYSTEMS:    MEDICATIONS  (STANDING):  dextrose 5% + sodium chloride 0.45%. 1000 milliLiter(s) (75 mL/Hr) IV Continuous <Continuous>  enoxaparin Injectable 40 milliGRAM(s) SubCutaneous every 24 hours  finasteride 5 milliGRAM(s) Oral daily  losartan 50 milliGRAM(s) Oral daily  memantine 10 milliGRAM(s) Oral daily  metoprolol tartrate 25 milliGRAM(s) Oral two times a day  tamsulosin 0.4 milliGRAM(s) Oral at bedtime  zinc oxide 20% Ointment 1 Application(s) Topical three times a day    MEDICATIONS  (PRN):      CAPILLARY BLOOD GLUCOSE        I&O's Summary    10 Feb 2025 07:01  -  10 Feb 2025 13:33  --------------------------------------------------------  IN: 700 mL / OUT: 0 mL / NET: 700 mL        PHYSICAL EXAM:  Vital Signs Last 24 Hrs  T(C): 36.6 (10 Feb 2025 13:00), Max: 36.7 (09 Feb 2025 14:35)  T(F): 97.8 (10 Feb 2025 13:00), Max: 98.1 (09 Feb 2025 14:35)  HR: 82 (10 Feb 2025 13:00) (59 - 139)  BP: 133/78 (10 Feb 2025 13:00) (117/75 - 159/70)  BP(mean): --  RR: 16 (10 Feb 2025 13:00) (16 - 18)  SpO2: 99% (10 Feb 2025 13:00) (98% - 100%)    Parameters below as of 10 Feb 2025 13:00  Patient On (Oxygen Delivery Method): room air      CONSTITUTIONAL: NAD  EYES: PERRL, No conjunctival or scleral injection, non-icteric  RESP: CTA b/l, no W/R/R  CV: Regular rate and rhythm, no M/R/G  GI: Soft, NT, ND, no rebound, no guarding  MSK: No clubbing, cyanosis, or edema   SKIN: No rashes or ulcers noted  NEURO: Lethargic, does not follow commands, CN II-XII grossly intact, moves extremities     LABS:                        14.4   8.30  )-----------( 335      ( 10 Feb 2025 06:00 )             41.1     02-10    137  |  103  |  12  ----------------------------<  78  4.4   |  20[L]  |  0.88    Ca    9.1      10 Feb 2025 06:00  Phos  3.5     02-10  Mg     2.00     02-10            Urinalysis Basic - ( 10 Feb 2025 06:00 )    Color: x / Appearance: x / SG: x / pH: x  Gluc: 78 mg/dL / Ketone: x  / Bili: x / Urobili: x   Blood: x / Protein: x / Nitrite: x   Leuk Esterase: x / RBC: x / WBC x   Sq Epi: x / Non Sq Epi: x / Bacteria: x          RADIOLOGY & ADDITIONAL TESTS:  Results Reviewed:   Imaging Personally Reviewed:  Electrocardiogram Personally Reviewed:    COORDINATION OF CARE:  Care Discussed with Consultants/Other Providers [Y/N]:  Prior or Outpatient Records Reviewed [Y/N]:

## 2025-02-11 LAB
ANION GAP SERPL CALC-SCNC: 15 MMOL/L — HIGH (ref 7–14)
BUN SERPL-MCNC: 12 MG/DL — SIGNIFICANT CHANGE UP (ref 7–23)
CALCIUM SERPL-MCNC: 8.8 MG/DL — SIGNIFICANT CHANGE UP (ref 8.4–10.5)
CHLORIDE SERPL-SCNC: 105 MMOL/L — SIGNIFICANT CHANGE UP (ref 98–107)
CO2 SERPL-SCNC: 16 MMOL/L — LOW (ref 22–31)
CREAT SERPL-MCNC: 0.88 MG/DL — SIGNIFICANT CHANGE UP (ref 0.5–1.3)
EGFR: 83 ML/MIN/1.73M2 — SIGNIFICANT CHANGE UP
GLUCOSE SERPL-MCNC: 106 MG/DL — HIGH (ref 70–99)
MAGNESIUM SERPL-MCNC: 2.1 MG/DL — SIGNIFICANT CHANGE UP (ref 1.6–2.6)
PHOSPHATE SERPL-MCNC: 3.3 MG/DL — SIGNIFICANT CHANGE UP (ref 2.5–4.5)
POTASSIUM SERPL-MCNC: 4.7 MMOL/L — SIGNIFICANT CHANGE UP (ref 3.5–5.3)
POTASSIUM SERPL-SCNC: 4.7 MMOL/L — SIGNIFICANT CHANGE UP (ref 3.5–5.3)
SODIUM SERPL-SCNC: 136 MMOL/L — SIGNIFICANT CHANGE UP (ref 135–145)

## 2025-02-11 PROCEDURE — 99232 SBSQ HOSP IP/OBS MODERATE 35: CPT

## 2025-02-11 RX ADMIN — METOPROLOL SUCCINATE 5 MILLIGRAM(S): 50 TABLET, EXTENDED RELEASE ORAL at 11:07

## 2025-02-11 RX ADMIN — SODIUM CHLORIDE 75 MILLILITER(S): 9 INJECTION, SOLUTION INTRAVENOUS at 15:45

## 2025-02-11 RX ADMIN — TOUCHLESS CARE ZINC OXIDE PROTECTANT 1 APPLICATION(S): 20; 25 SPRAY TOPICAL at 11:07

## 2025-02-11 RX ADMIN — TOUCHLESS CARE ZINC OXIDE PROTECTANT 1 APPLICATION(S): 20; 25 SPRAY TOPICAL at 22:26

## 2025-02-11 RX ADMIN — TOUCHLESS CARE ZINC OXIDE PROTECTANT 1 APPLICATION(S): 20; 25 SPRAY TOPICAL at 06:32

## 2025-02-11 RX ADMIN — ENOXAPARIN SODIUM 40 MILLIGRAM(S): 100 INJECTION SUBCUTANEOUS at 11:07

## 2025-02-11 RX ADMIN — SODIUM CHLORIDE 75 MILLILITER(S): 9 INJECTION, SOLUTION INTRAVENOUS at 17:00

## 2025-02-11 RX ADMIN — METOPROLOL SUCCINATE 5 MILLIGRAM(S): 50 TABLET, EXTENDED RELEASE ORAL at 23:28

## 2025-02-11 RX ADMIN — METOPROLOL SUCCINATE 5 MILLIGRAM(S): 50 TABLET, EXTENDED RELEASE ORAL at 17:01

## 2025-02-11 RX ADMIN — METOPROLOL SUCCINATE 5 MILLIGRAM(S): 50 TABLET, EXTENDED RELEASE ORAL at 04:22

## 2025-02-11 NOTE — PROGRESS NOTE ADULT - PROBLEM SELECTOR PLAN 8
- in setting of dementia   - failed speech and swallow  - spoke w/ daughter, not interested in PEG and would like to initiate pleasure feeds. Daughter understands risk of aspiration.   - on pureed diet w/ moderate thickened liquids, but patient w/ no PO intake    - aspiration precautions  - assistance w/ meals

## 2025-02-11 NOTE — PROVIDER CONTACT NOTE (OTHER) - ASSESSMENT
Pt restning in bed, no s/s of pain or discomfort. VS stable. Pt resting in bed, no s/s of pain or discomfort. VS stable.

## 2025-02-11 NOTE — PROGRESS NOTE ADULT - PROBLEM SELECTOR PLAN 3
New Afib w/ RVR in setting of underlying sepsis  - had RVR overnight d/t missing dose of PO Lopressor   - TTE showed nl LV function. grade 1 diastolic   - s/p digoxin load, now holding   - transition back to Metoprolol 5mg IV q6h   - Telemetry monitoring  - defer anticoagulation given fall risk and advanced dementia  - cardiology input appreciated

## 2025-02-11 NOTE — PROGRESS NOTE ADULT - SUBJECTIVE AND OBJECTIVE BOX
PROGRESS NOTE:     Patient is a 89y old  Male who presents with a chief complaint of AMS (11 Feb 2025 08:44)      SUBJECTIVE / OVERNIGHT EVENTS: pt is not eating and is spitting up meds.     ADDITIONAL REVIEW OF SYSTEMS:    MEDICATIONS  (STANDING):  atorvastatin 20 milliGRAM(s) Oral at bedtime  dextrose 5% + sodium chloride 0.45%. 1000 milliLiter(s) (75 mL/Hr) IV Continuous <Continuous>  enoxaparin Injectable 40 milliGRAM(s) SubCutaneous every 24 hours  finasteride 5 milliGRAM(s) Oral daily  losartan 50 milliGRAM(s) Oral daily  memantine 10 milliGRAM(s) Oral daily  metoprolol tartrate Injectable 5 milliGRAM(s) IV Push every 6 hours  tamsulosin 0.4 milliGRAM(s) Oral at bedtime  zinc oxide 20% Ointment 1 Application(s) Topical three times a day    MEDICATIONS  (PRN):      CAPILLARY BLOOD GLUCOSE        I&O's Summary    10 Feb 2025 07:01  -  11 Feb 2025 07:00  --------------------------------------------------------  IN: 1425 mL / OUT: 0 mL / NET: 1425 mL    11 Feb 2025 07:01  -  11 Feb 2025 12:56  --------------------------------------------------------  IN: 600 mL / OUT: 0 mL / NET: 600 mL        PHYSICAL EXAM:  Vital Signs Last 24 Hrs  T(C): 36.4 (11 Feb 2025 11:00), Max: 37.4 (10 Feb 2025 20:49)  T(F): 97.5 (11 Feb 2025 11:00), Max: 99.3 (10 Feb 2025 20:49)  HR: 65 (11 Feb 2025 11:00) (60 - 155)  BP: 159/81 (11 Feb 2025 11:00) (122/90 - 159/81)  BP(mean): --  RR: 16 (11 Feb 2025 11:00) (16 - 18)  SpO2: 100% (11 Feb 2025 11:00) (97% - 100%)    Parameters below as of 11 Feb 2025 11:00  Patient On (Oxygen Delivery Method): room air    CONSTITUTIONAL: NAD  EYES: PERRL, No conjunctival or scleral injection, non-icteric  RESP: CTA b/l, no W/R/R  CV: Regular rate and rhythm, no M/R/G  GI: Soft, NT, ND, no rebound, no guarding  MSK: No clubbing, cyanosis, or edema   SKIN: No rashes or ulcers noted  NEURO: Opens eyes, follows simple commands (in [place]uvian), CN II-XII grossly intact, moves extremities     LABS:                        14.4   8.30  )-----------( 335      ( 10 Feb 2025 06:00 )             41.1     02-11    136  |  105  |  12  ----------------------------<  106[H]  4.7   |  16[L]  |  0.88    Ca    8.8      11 Feb 2025 07:00  Phos  3.3     02-11  Mg     2.10     02-11            Urinalysis Basic - ( 11 Feb 2025 07:00 )    Color: x / Appearance: x / SG: x / pH: x  Gluc: 106 mg/dL / Ketone: x  / Bili: x / Urobili: x   Blood: x / Protein: x / Nitrite: x   Leuk Esterase: x / RBC: x / WBC x   Sq Epi: x / Non Sq Epi: x / Bacteria: x          RADIOLOGY & ADDITIONAL TESTS:  Results Reviewed:   Imaging Personally Reviewed:  Electrocardiogram Personally Reviewed:    COORDINATION OF CARE:  Care Discussed with Consultants/Other Providers [Y/N]:  Prior or Outpatient Records Reviewed [Y/N]:

## 2025-02-11 NOTE — PROGRESS NOTE ADULT - SUBJECTIVE AND OBJECTIVE BOX
Cardiovascular Disease Progress Note  DATE OF SERVICE: 02-11-25 @ 08:44    Overnight events: a-fib with RVR noted overnight.    The patient is in no distress.   Otherwise review of systems negative    Objective Findings:  T(C): 37 (02-11-25 @ 04:20), Max: 37.4 (02-10-25 @ 20:49)  HR: 132 (02-11-25 @ 04:20) (59 - 155)  BP: 134/73 (02-11-25 @ 04:20) (122/90 - 159/70)  RR: 18 (02-11-25 @ 04:20) (16 - 18)  SpO2: 97% (02-11-25 @ 04:20) (97% - 100%)  Wt(kg): --  Daily     Daily       Physical Exam:  Gen: NAD; Patient resting comfortably  HEENT: EOMI, Normocephalic/ atraumatic  CV: RRR, normal S1 + S2, no m/r/g  Lungs:  Normal respiratory effort; clear to auscultation bilaterally  Abd: soft, non-tender; bowel sounds present  Ext: No edema; warm and well perfused    Telemetry: A-fib    Laboratory Data:                        14.4   8.30  )-----------( 335      ( 10 Feb 2025 06:00 )             41.1     02-11    136  |  105  |  12  ----------------------------<  106[H]  4.7   |  16[L]  |  0.88    Ca    8.8      11 Feb 2025 07:00  Phos  3.3     02-11  Mg     2.10     02-11                Inpatient Medications:  MEDICATIONS  (STANDING):  atorvastatin 20 milliGRAM(s) Oral at bedtime  dextrose 5% + sodium chloride 0.45%. 1000 milliLiter(s) (75 mL/Hr) IV Continuous <Continuous>  enoxaparin Injectable 40 milliGRAM(s) SubCutaneous every 24 hours  finasteride 5 milliGRAM(s) Oral daily  losartan 50 milliGRAM(s) Oral daily  memantine 10 milliGRAM(s) Oral daily  metoprolol tartrate Injectable 5 milliGRAM(s) IV Push every 6 hours  tamsulosin 0.4 milliGRAM(s) Oral at bedtime  zinc oxide 20% Ointment 1 Application(s) Topical three times a day      Assessment: 89 year old man with HTN, hyperlipidemia, and advanced dementia presents with a-fib with RVR and COVID-19.     Plan of Care:    #A-fib with RVR-  RVR noted overnight.  Of note, the patient missed oral Lopressor due to inability to tolerate PO.  Continue IV Lopressor.   No acute findings on echo.   Given debility, fall risk, and advanced dementia, the risks of anticoagulation outweigh the benefits.    #COVID-19-  s/p remdesivir by the primary team.        Over 55 minutes spent on total encounter; 100% of the visit was spent counseling and/or coordinating care by the attending physician.      Mello Kinney MD [facility]  Cardiovascular Disease  [phone]

## 2025-02-11 NOTE — PROGRESS NOTE ADULT - PROBLEM SELECTOR PLAN 2
Likely d/t COVID infection w/ hypoactive delirium and underlying dementia   - management of COVID as above  - s/p IVF   - remains confused, not taking PO

## 2025-02-11 NOTE — PROGRESS NOTE ADULT - PROBLEM SELECTOR PLAN 9
DVT ppx:  Lovenox    Dispo:  PT re-eval ongoing, patient unlikely to qualify for rehab     Code Status:  DNR/DNI    POC d/w daughter. Daughter to speak w/ rest of family and decide on home hospice vs NH w/ hospice.

## 2025-02-11 NOTE — CHART NOTE - NSCHARTNOTEFT_GEN_A_CORE
NUTRITION FOLLOW UP NOTE          Source: Patient A&Ox0    Family [ x ]     RN [  ]    Chart [ x ]     Pt seen for nutrition follow up due to severe malnutrition.    MEDICAL COURSE  Per chart review, patient is a 89 year old male with a PMHx of HTN, HLD, dementia, and prostate cancer BIBEMS for AMS and weakness, found to be COVID 19 positive, also w/ new onset Afib w/ RVR. mental status improving. PO intake improving with family.       ACTIVE DIET ORDER  Diet, Pureed:   Moderately Thick Liquids (MODTHICKLIQS) (02-02-25 @ 12:16)      NUTRITION COURSE  Patient seen in his room, daughter at bedside. Patient continues to exhibit poor oral intake per daughter. Pt's daughter reports taking Ensure ONS PTA, interested in receiving it vs Might Shake 4 oz. (200 kcal, 6 gms pro). Refer to team for feasibility on pureed and moderate thick liquids pleasure feeds secondary to high risk of aspiration. Will continue offer Magic Cup 4 oz. (290 kcal, 6 gms protein) and Might Shake 4 oz. (200 kcal, 6 gms pro) at this time. Noted 0-25% intake per RN flow sheet. No reported GI issues such as nausea/vomiting/diarrhea/constipation, noted fecal incontinence with last bowel movement on 2/8 per RN flow sheet. Patient is DNI/DNR, PEG not in line with family's decision, GOC ongoing for home hospice or NH hospice.     PHYSICAL ASSESSMENT, per RN flow sheet  EDEMA: No edema per RN flow sheets   SKIN: Incontinence Associated Dermatitis     PERTINENT MEDICATION  MEDICATIONS  (STANDING):  atorvastatin 20 milliGRAM(s) Oral at bedtime  dextrose 5% + sodium chloride 0.45%. 1000 milliLiter(s) (75 mL/Hr) IV Continuous <Continuous>  enoxaparin Injectable 40 milliGRAM(s) SubCutaneous every 24 hours  finasteride 5 milliGRAM(s) Oral daily  losartan 50 milliGRAM(s) Oral daily  memantine 10 milliGRAM(s) Oral daily  metoprolol tartrate Injectable 5 milliGRAM(s) IV Push every 6 hours  tamsulosin 0.4 milliGRAM(s) Oral at bedtime  zinc oxide 20% Ointment 1 Application(s) Topical three times a day    MEDICATIONS  (PRN):      PERTINENT LABS  02-11    136  |  105  |  12  ----------------------------<  106[H]  4.7   |  16[L]  |  0.88    Ca    8.8      11 Feb 2025 07:00  Phos  3.3     02-11  Mg     2.10     02-11                        14.4   8.30  )-----------( 335      ( 10 Feb 2025 06:00 )             41.1        POCT TRENDING x 72 hrs      ANTHROPOMETRICS  Height (cm): 157.5 (02-03)  Weight (kg): 71.2 (01-29)  BMI (kg/m2): 28.7 (02-03)  IBW: 118 lbs/54.5kg  +/- 10%    WEIGHT ASSESSMENT: per RN flow sheet in KG  Bed scale weight 62.5kg (2/1) ?  % weight change : n/a, no new weight to assess for this follow up     ESTIMATED NEEDS ASSESSMENT  [ x ] No change in need assessment, weight used: IBW 53.5kg  Estimated Energy: 8309-4770 Kcal/kg/day ( 30-35 kcal/kg)  Estimated Protein: 64.2-74.9 gm/kg/day ( 1.2-1.4 gm/kg)    PREVIOUS NUTRITION DIAGNOSIS  [ x ] Severe malnutrition  Nutrition Diagnosis is : [ x ] ongoing    New Nutrition Diagnosis : [ x ] not applicable     EDUCATION  [ x ] Not warranted at present    RECOMMENDATION  [ x ] Defer diet/texture modification to medical team/SLP as indicated.   [ x ] Please consider adding Ensure Plus High Protein 8 oz. 1 x/day, per preference; supplement to be thickened to moderate thick liquids  [ x ] Please consider adding appetite stimulant and multivitamin if patient can tolerate PO med  [ x ] Honor food and fluid preferences as able.  [ x ] Replete electrolytes (Na, K, Phos, Mg) PRN    MONITORING AND EVALUATION   [ x ] Monitor PO intake/tolerance, skin integrity, bowel regimen, and nutrition pertinent labs.  [ x ] Tolerance to diet prescription [ x ] weights [ x ] follow up per protocol

## 2025-02-12 LAB
ANION GAP SERPL CALC-SCNC: 15 MMOL/L — HIGH (ref 7–14)
BUN SERPL-MCNC: 12 MG/DL — SIGNIFICANT CHANGE UP (ref 7–23)
CALCIUM SERPL-MCNC: 8.7 MG/DL — SIGNIFICANT CHANGE UP (ref 8.4–10.5)
CHLORIDE SERPL-SCNC: 105 MMOL/L — SIGNIFICANT CHANGE UP (ref 98–107)
CO2 SERPL-SCNC: 14 MMOL/L — LOW (ref 22–31)
CREAT SERPL-MCNC: 0.79 MG/DL — SIGNIFICANT CHANGE UP (ref 0.5–1.3)
EGFR: 85 ML/MIN/1.73M2 — SIGNIFICANT CHANGE UP
GLUCOSE SERPL-MCNC: 103 MG/DL — HIGH (ref 70–99)
MAGNESIUM SERPL-MCNC: 2.1 MG/DL — SIGNIFICANT CHANGE UP (ref 1.6–2.6)
PHOSPHATE SERPL-MCNC: 3.4 MG/DL — SIGNIFICANT CHANGE UP (ref 2.5–4.5)
POTASSIUM SERPL-MCNC: 5.1 MMOL/L — SIGNIFICANT CHANGE UP (ref 3.5–5.3)
POTASSIUM SERPL-SCNC: 5.1 MMOL/L — SIGNIFICANT CHANGE UP (ref 3.5–5.3)
SODIUM SERPL-SCNC: 134 MMOL/L — LOW (ref 135–145)

## 2025-02-12 PROCEDURE — 99232 SBSQ HOSP IP/OBS MODERATE 35: CPT

## 2025-02-12 RX ADMIN — METOPROLOL SUCCINATE 5 MILLIGRAM(S): 50 TABLET, EXTENDED RELEASE ORAL at 12:20

## 2025-02-12 RX ADMIN — TOUCHLESS CARE ZINC OXIDE PROTECTANT 1 APPLICATION(S): 20; 25 SPRAY TOPICAL at 12:20

## 2025-02-12 RX ADMIN — METOPROLOL SUCCINATE 5 MILLIGRAM(S): 50 TABLET, EXTENDED RELEASE ORAL at 23:54

## 2025-02-12 RX ADMIN — METOPROLOL SUCCINATE 5 MILLIGRAM(S): 50 TABLET, EXTENDED RELEASE ORAL at 05:47

## 2025-02-12 RX ADMIN — ENOXAPARIN SODIUM 40 MILLIGRAM(S): 100 INJECTION SUBCUTANEOUS at 12:20

## 2025-02-12 RX ADMIN — TOUCHLESS CARE ZINC OXIDE PROTECTANT 1 APPLICATION(S): 20; 25 SPRAY TOPICAL at 05:47

## 2025-02-12 RX ADMIN — TOUCHLESS CARE ZINC OXIDE PROTECTANT 1 APPLICATION(S): 20; 25 SPRAY TOPICAL at 22:01

## 2025-02-12 RX ADMIN — METOPROLOL SUCCINATE 5 MILLIGRAM(S): 50 TABLET, EXTENDED RELEASE ORAL at 17:17

## 2025-02-12 NOTE — PROGRESS NOTE ADULT - PROBLEM SELECTOR PLAN 3
Viral sepsis POA d/t COVID infection, sepsis resolved   - s/p Remdesivir   - CXR w/ clear lungs   - blood cultures NGTD   - procalcitonin 0.19   - dc Zosyn

## 2025-02-12 NOTE — PROGRESS NOTE ADULT - PROBLEM SELECTOR PLAN 2
New Afib w/ RVR, improved   - TTE showed nl LV function. grade 1 diastolic   - s/p digoxin load, now holding   - transitioned back to Metoprolol 5mg IV q6h   - Telemetry monitoring  - defer anticoagulation given fall risk and advanced dementia  - cardiology input appreciated

## 2025-02-12 NOTE — PROGRESS NOTE ADULT - CONVERSATION DETAILS
Spoke w/ daughters at bedside. Explained the risks and benefits of pleasure feeds vs PEG tube. Family expressed understanding. Discussed options including hospice, daughters are open to a hospice referral.

## 2025-02-12 NOTE — PROGRESS NOTE ADULT - SUBJECTIVE AND OBJECTIVE BOX
PROGRESS NOTE:     Patient is a 89y old  Male who presents with a chief complaint of AMS (12 Feb 2025 07:00)      SUBJECTIVE / OVERNIGHT EVENTS: no acute events.    ADDITIONAL REVIEW OF SYSTEMS:    MEDICATIONS  (STANDING):  atorvastatin 20 milliGRAM(s) Oral at bedtime  dextrose 5% + sodium chloride 0.45%. 1000 milliLiter(s) (75 mL/Hr) IV Continuous <Continuous>  enoxaparin Injectable 40 milliGRAM(s) SubCutaneous every 24 hours  finasteride 5 milliGRAM(s) Oral daily  losartan 50 milliGRAM(s) Oral daily  memantine 10 milliGRAM(s) Oral daily  metoprolol tartrate Injectable 5 milliGRAM(s) IV Push every 6 hours  tamsulosin 0.4 milliGRAM(s) Oral at bedtime  zinc oxide 20% Ointment 1 Application(s) Topical three times a day    MEDICATIONS  (PRN):      CAPILLARY BLOOD GLUCOSE        I&O's Summary    11 Feb 2025 07:01  -  12 Feb 2025 07:00  --------------------------------------------------------  IN: 1075 mL / OUT: 0 mL / NET: 1075 mL    12 Feb 2025 07:01  -  12 Feb 2025 11:15  --------------------------------------------------------  IN: 0 mL / OUT: 0 mL / NET: 0 mL        PHYSICAL EXAM:  Vital Signs Last 24 Hrs  T(C): 36.4 (12 Feb 2025 08:00), Max: 36.9 (11 Feb 2025 22:02)  T(F): 97.5 (12 Feb 2025 08:00), Max: 98.4 (11 Feb 2025 22:02)  HR: 80 (12 Feb 2025 08:00) (68 - 86)  BP: 155/87 (12 Feb 2025 08:00) (138/75 - 164/90)  BP(mean): --  RR: 18 (12 Feb 2025 08:00) (16 - 18)  SpO2: 99% (12 Feb 2025 08:00) (95% - 100%)    Parameters below as of 12 Feb 2025 08:00  Patient On (Oxygen Delivery Method): room air      CONSTITUTIONAL: NAD  EYES: PERRL, No conjunctival or scleral injection, non-icteric  RESP: CTA b/l, no W/R/R  CV: Regular rate and rhythm, no M/R/G  GI: Soft, NT, ND, no rebound, no guarding  MSK: No clubbing, cyanosis, or edema   SKIN: No rashes or ulcers noted  NEURO: Opens eyes, not answering questions, CN II-XII grossly intact, moves extremities     LABS:    02-12    134[L]  |  105  |  12  ----------------------------<  103[H]  5.1   |  14[L]  |  0.79    Ca    8.7      12 Feb 2025 07:40  Phos  3.4     02-12  Mg     2.10     02-12            Urinalysis Basic - ( 12 Feb 2025 07:40 )    Color: x / Appearance: x / SG: x / pH: x  Gluc: 103 mg/dL / Ketone: x  / Bili: x / Urobili: x   Blood: x / Protein: x / Nitrite: x   Leuk Esterase: x / RBC: x / WBC x   Sq Epi: x / Non Sq Epi: x / Bacteria: x          RADIOLOGY & ADDITIONAL TESTS:  Results Reviewed:   Imaging Personally Reviewed:  Electrocardiogram Personally Reviewed:    COORDINATION OF CARE:  Care Discussed with Consultants/Other Providers [Y/N]:  Prior or Outpatient Records Reviewed [Y/N]:

## 2025-02-12 NOTE — PROGRESS NOTE ADULT - SUBJECTIVE AND OBJECTIVE BOX
Cardiovascular Disease Progress Note  DATE OF SERVICE: 02-12-25 @ 08:00    Overnight events: No acute events overnight.    The patient is in no distress.   Otherwise review of systems negative    Objective Findings:  T(C): 36.3 (02-12-25 @ 05:45), Max: 36.9 (02-11-25 @ 22:02)  HR: 75 (02-12-25 @ 05:45) (60 - 86)  BP: 164/90 (02-12-25 @ 05:45) (129/78 - 164/90)  RR: 18 (02-12-25 @ 05:45) (16 - 18)  SpO2: 98% (02-12-25 @ 05:45) (95% - 100%)  Wt(kg): --  Daily     Daily       Physical Exam:  Gen: NAD; Patient resting comfortably  HEENT:   Normocephalic/ atraumatic  CV: RRR, normal S1 + S2, no m/r/g  Lungs:  Normal respiratory effort; clear to auscultation bilaterally  Abd: soft, non-tender; bowel sounds present  Ext: No edema; warm and well perfused    Telemetry: Sinus    Laboratory Data:    02-11    136  |  105  |  12  ----------------------------<  106[H]  4.7   |  16[L]  |  0.88    Ca    8.8      11 Feb 2025 07:00  Phos  3.3     02-11  Mg     2.10     02-11                Inpatient Medications:  MEDICATIONS  (STANDING):  atorvastatin 20 milliGRAM(s) Oral at bedtime  dextrose 5% + sodium chloride 0.45%. 1000 milliLiter(s) (75 mL/Hr) IV Continuous <Continuous>  enoxaparin Injectable 40 milliGRAM(s) SubCutaneous every 24 hours  finasteride 5 milliGRAM(s) Oral daily  losartan 50 milliGRAM(s) Oral daily  memantine 10 milliGRAM(s) Oral daily  metoprolol tartrate Injectable 5 milliGRAM(s) IV Push every 6 hours  tamsulosin 0.4 milliGRAM(s) Oral at bedtime  zinc oxide 20% Ointment 1 Application(s) Topical three times a day      Assessment: 89 year old man with HTN, hyperlipidemia, and advanced dementia presents with a-fib with RVR and COVID-19.     Plan of Care:    #A-fib with RVR-  Rates now controlled after Lopressor changed back to IV.  No acute findings on echo.   Given debility, fall risk, and advanced dementia, the risks of anticoagulation outweigh the benefits.  Dysphagia and GOC discussion regarding feeding, as per the primary team      Over 55 minutes spent on total encounter; 100% of the visit was spent counseling and/or coordinating care by the attending physician.      Mello Kinney MD [facility]  Cardiovascular Disease  [phone] Cardiovascular Disease Progress Note  DATE OF SERVICE: 02-12-25 @ 08:00    Overnight events: No acute events overnight.    The patient is in no distress.   Otherwise review of systems negative    Objective Findings:  T(C): 36.3 (02-12-25 @ 05:45), Max: 36.9 (02-11-25 @ 22:02)  HR: 75 (02-12-25 @ 05:45) (60 - 86)  BP: 164/90 (02-12-25 @ 05:45) (129/78 - 164/90)  RR: 18 (02-12-25 @ 05:45) (16 - 18)  SpO2: 98% (02-12-25 @ 05:45) (95% - 100%)  Wt(kg): --  Daily     Daily       Physical Exam:  Gen: NAD; Patient resting comfortably  HEENT:   Normocephalic/ atraumatic  CV: RRR, normal S1 + S2, no m/r/g  Lungs:  Normal respiratory effort; clear to auscultation bilaterally  Abd: soft, non-tender; bowel sounds present  Ext: No edema; warm and well perfused    Telemetry: Sinus    Laboratory Data:    02-11    136  |  105  |  12  ----------------------------<  106[H]  4.7   |  16[L]  |  0.88    Ca    8.8      11 Feb 2025 07:00  Phos  3.3     02-11  Mg     2.10     02-11                Inpatient Medications:  MEDICATIONS  (STANDING):  atorvastatin 20 milliGRAM(s) Oral at bedtime  dextrose 5% + sodium chloride 0.45%. 1000 milliLiter(s) (75 mL/Hr) IV Continuous <Continuous>  enoxaparin Injectable 40 milliGRAM(s) SubCutaneous every 24 hours  finasteride 5 milliGRAM(s) Oral daily  losartan 50 milliGRAM(s) Oral daily  memantine 10 milliGRAM(s) Oral daily  metoprolol tartrate Injectable 5 milliGRAM(s) IV Push every 6 hours  tamsulosin 0.4 milliGRAM(s) Oral at bedtime  zinc oxide 20% Ointment 1 Application(s) Topical three times a day      Assessment: 89 year old man with HTN, hyperlipidemia, and advanced dementia presents with a-fib with RVR and COVID-19.     Plan of Care:    #A-fib with RVR-  Rates now controlled after Lopressor changed back to IV.  No acute findings on echo.   Given debility, fall risk, and advanced dementia, the risks of anticoagulation outweigh the benefits.  Dysphagia and GOC discussion regarding feeding, as per the primary team    #ACP (advance care planning)-  CPT 00548  Advanced care planning was addressed.  Advance care planning forms were discussed.   Conservative cardiac management given dementia and debility.  Dysphagia and GOC discussion regarding feeding, as per the primary team.  30 additional minutes spent addressing advance care plans.      Over 55 minutes spent on total encounter; 100% of the visit was spent counseling and/or coordinating care by the attending physician.      Mello Kinney MD [facility]  Cardiovascular Disease  [phone]

## 2025-02-13 PROCEDURE — 99232 SBSQ HOSP IP/OBS MODERATE 35: CPT

## 2025-02-13 RX ADMIN — TOUCHLESS CARE ZINC OXIDE PROTECTANT 1 APPLICATION(S): 20; 25 SPRAY TOPICAL at 21:08

## 2025-02-13 RX ADMIN — ENOXAPARIN SODIUM 40 MILLIGRAM(S): 100 INJECTION SUBCUTANEOUS at 12:34

## 2025-02-13 RX ADMIN — SODIUM CHLORIDE 75 MILLILITER(S): 9 INJECTION, SOLUTION INTRAVENOUS at 20:11

## 2025-02-13 RX ADMIN — METOPROLOL SUCCINATE 5 MILLIGRAM(S): 50 TABLET, EXTENDED RELEASE ORAL at 05:33

## 2025-02-13 RX ADMIN — METOPROLOL SUCCINATE 5 MILLIGRAM(S): 50 TABLET, EXTENDED RELEASE ORAL at 12:33

## 2025-02-13 RX ADMIN — TOUCHLESS CARE ZINC OXIDE PROTECTANT 1 APPLICATION(S): 20; 25 SPRAY TOPICAL at 05:33

## 2025-02-13 RX ADMIN — TOUCHLESS CARE ZINC OXIDE PROTECTANT 1 APPLICATION(S): 20; 25 SPRAY TOPICAL at 12:34

## 2025-02-13 RX ADMIN — METOPROLOL SUCCINATE 5 MILLIGRAM(S): 50 TABLET, EXTENDED RELEASE ORAL at 18:15

## 2025-02-13 RX ADMIN — METOPROLOL SUCCINATE 5 MILLIGRAM(S): 50 TABLET, EXTENDED RELEASE ORAL at 23:22

## 2025-02-13 NOTE — PROGRESS NOTE ADULT - RESPIRATORY
normal/clear to auscultation bilaterally/no wheezes/no rales/no rhonchi/no respiratory distress
normal/clear to auscultation bilaterally/no wheezes/no rales/no rhonchi

## 2025-02-13 NOTE — PROGRESS NOTE ADULT - TIME BILLING
- Ordering, reviewing, and interpreting labs, testing, and imaging.  - Independently obtaining a review of systems and performing a physical exam  - Reviewing prior hospitalization and where necessary, outpatient records.  - Reviewing consultant recommendations/communicating with consultants  - Counselling and educating patient and family regarding interpretation of aforementioned items and plan of care.

## 2025-02-13 NOTE — PROGRESS NOTE ADULT - GASTROINTESTINAL
normal/soft/nontender/nondistended/normal active bowel sounds

## 2025-02-13 NOTE — PROGRESS NOTE ADULT - PROBLEM SELECTOR PLAN 9
DVT ppx:  Lovenox    Dispo:  PT re-eval ongoing, patient unlikely to qualify for rehab     Code Status:  DNR/DNI    POC d/w daughter. now decided on hospice care. f/u CM/SW for home hospice.

## 2025-02-13 NOTE — PROGRESS NOTE ADULT - CARDIOVASCULAR
normal/regular rate and rhythm/S1 S2 present/no gallops/no rub/no murmur

## 2025-02-13 NOTE — PROGRESS NOTE ADULT - SUBJECTIVE AND OBJECTIVE BOX
Cardiovascular Disease Progress Note  DATE OF SERVICE: 02-13-25 @ 09:03    Overnight events: No acute events overnight.    The patient is in no distress.   Otherwise review of systems negative    Objective Findings:  T(C): 36.5 (02-13-25 @ 05:30), Max: 36.7 (02-12-25 @ 23:45)  HR: 98 (02-13-25 @ 05:30) (70 - 98)  BP: 167/81 (02-13-25 @ 05:30) (142/73 - 167/85)  RR: 18 (02-13-25 @ 05:30) (18 - 18)  SpO2: 98% (02-13-25 @ 05:30) (96% - 100%)  Wt(kg): --  Daily     Daily       Physical Exam:  Gen: NAD; Patient resting comfortably  HEENT: EOMI, Normocephalic/ atraumatic  CV: RRR, normal S1 + S2, no m/r/g  Lungs:  Normal respiratory effort; clear to auscultation bilaterally  Abd: soft, non-tender; bowel sounds present  Ext: No edema; warm and well perfused    Telemetry: Sinus    Laboratory Data:    02-12    134[L]  |  105  |  12  ----------------------------<  103[H]  5.1   |  14[L]  |  0.79    Ca    8.7      12 Feb 2025 07:40  Phos  3.4     02-12  Mg     2.10     02-12                Inpatient Medications:  MEDICATIONS  (STANDING):  atorvastatin 20 milliGRAM(s) Oral at bedtime  dextrose 5% + sodium chloride 0.45%. 1000 milliLiter(s) (75 mL/Hr) IV Continuous <Continuous>  enoxaparin Injectable 40 milliGRAM(s) SubCutaneous every 24 hours  finasteride 5 milliGRAM(s) Oral daily  losartan 50 milliGRAM(s) Oral daily  memantine 10 milliGRAM(s) Oral daily  metoprolol tartrate Injectable 5 milliGRAM(s) IV Push every 6 hours  tamsulosin 0.4 milliGRAM(s) Oral at bedtime  zinc oxide 20% Ointment 1 Application(s) Topical three times a day      Assessment:  89 year old man with HTN, hyperlipidemia, and advanced dementia presents with a-fib with RVR and COVID-19.     Plan of Care:    #A-fib with RVR-  Brief bursts of RVR, but primarily in sinus after Lopressor changed back to IV.  No acute findings on echo.   Given debility, fall risk, and advanced dementia, the risks of anticoagulation outweigh the benefits.  [facility] discussion regarding feeding, as per the primary team        Over 55 minutes spent on total encounter; 100% of the visit was spent counseling and/or coordinating care by the attending physician.      Mello Kinney MD [facility]  Cardiovascular Disease  [phone]

## 2025-02-13 NOTE — PROGRESS NOTE ADULT - CONSTITUTIONAL
normal/well-groomed/no distress

## 2025-02-14 PROCEDURE — 99232 SBSQ HOSP IP/OBS MODERATE 35: CPT

## 2025-02-14 RX ADMIN — TOUCHLESS CARE ZINC OXIDE PROTECTANT 1 APPLICATION(S): 20; 25 SPRAY TOPICAL at 12:43

## 2025-02-14 RX ADMIN — TOUCHLESS CARE ZINC OXIDE PROTECTANT 1 APPLICATION(S): 20; 25 SPRAY TOPICAL at 21:51

## 2025-02-14 RX ADMIN — SODIUM CHLORIDE 75 MILLILITER(S): 9 INJECTION, SOLUTION INTRAVENOUS at 21:51

## 2025-02-14 RX ADMIN — SODIUM CHLORIDE 75 MILLILITER(S): 9 INJECTION, SOLUTION INTRAVENOUS at 05:08

## 2025-02-14 RX ADMIN — TOUCHLESS CARE ZINC OXIDE PROTECTANT 1 APPLICATION(S): 20; 25 SPRAY TOPICAL at 05:30

## 2025-02-14 RX ADMIN — METOPROLOL SUCCINATE 5 MILLIGRAM(S): 50 TABLET, EXTENDED RELEASE ORAL at 12:31

## 2025-02-14 RX ADMIN — ENOXAPARIN SODIUM 40 MILLIGRAM(S): 100 INJECTION SUBCUTANEOUS at 12:32

## 2025-02-14 RX ADMIN — METOPROLOL SUCCINATE 5 MILLIGRAM(S): 50 TABLET, EXTENDED RELEASE ORAL at 05:08

## 2025-02-14 RX ADMIN — METOPROLOL SUCCINATE 5 MILLIGRAM(S): 50 TABLET, EXTENDED RELEASE ORAL at 17:21

## 2025-02-14 NOTE — PROGRESS NOTE ADULT - SUBJECTIVE AND OBJECTIVE BOX
PROGRESS NOTE:     Patient is a 89y old  Male who presents with a chief complaint of AMS (14 Feb 2025 08:59)      SUBJECTIVE / OVERNIGHT EVENTS: no acute events.     ADDITIONAL REVIEW OF SYSTEMS:    MEDICATIONS  (STANDING):  atorvastatin 20 milliGRAM(s) Oral at bedtime  dextrose 5% + sodium chloride 0.45%. 1000 milliLiter(s) (75 mL/Hr) IV Continuous <Continuous>  enoxaparin Injectable 40 milliGRAM(s) SubCutaneous every 24 hours  finasteride 5 milliGRAM(s) Oral daily  losartan 50 milliGRAM(s) Oral daily  memantine 10 milliGRAM(s) Oral daily  metoprolol tartrate Injectable 5 milliGRAM(s) IV Push every 6 hours  tamsulosin 0.4 milliGRAM(s) Oral at bedtime  zinc oxide 20% Ointment 1 Application(s) Topical three times a day    MEDICATIONS  (PRN):      CAPILLARY BLOOD GLUCOSE        I&O's Summary      PHYSICAL EXAM:  Vital Signs Last 24 Hrs  T(C): 36.4 (14 Feb 2025 05:00), Max: 36.7 (13 Feb 2025 14:30)  T(F): 97.6 (14 Feb 2025 05:00), Max: 98.1 (13 Feb 2025 14:30)  HR: 82 (14 Feb 2025 05:00) (73 - 89)  BP: 160/88 (14 Feb 2025 05:00) (135/83 - 160/88)  BP(mean): --  RR: 18 (14 Feb 2025 05:00) (18 - 18)  SpO2: 100% (14 Feb 2025 05:00) (97% - 100%)    Parameters below as of 14 Feb 2025 05:00  Patient On (Oxygen Delivery Method): room air      CONSTITUTIONAL: NAD  EYES: PERRL, No conjunctival or scleral injection, non-icteric  RESP: CTA b/l, no W/R/R  CV: Regular rate and rhythm, no M/R/G  GI: Soft, NT, ND, no rebound, no guarding  MSK: No clubbing, cyanosis, or edema   SKIN: No rashes or ulcers noted  NEURO: Opens eyes, not answering questions, CN II-XII grossly intact, moves extremities       LABS:                      RADIOLOGY & ADDITIONAL TESTS:  Results Reviewed:   Imaging Personally Reviewed:  Electrocardiogram Personally Reviewed:    COORDINATION OF CARE:  Care Discussed with Consultants/Other Providers [Y/N]:  Prior or Outpatient Records Reviewed [Y/N]:

## 2025-02-14 NOTE — PROGRESS NOTE ADULT - PROBLEM SELECTOR PLAN 8
- in setting of dementia   - failed speech and swallow  - spoke w/ daughter, not interested in PEG and would like to initiate pleasure feeds. Daughter understands risk of aspiration.   - on pureed diet w/ moderate thickened liquids, but patient w/ poor PO intake    - aspiration precautions  - assistance w/ meals

## 2025-02-14 NOTE — PROGRESS NOTE ADULT - PROBLEM SELECTOR PLAN 2
New Afib w/ RVR in setting of underlying sepsis  - had RVR overnight d/t missing dose of PO Lopressor   - TTE showed nl LV function. grade 1 diastolic   - s/p digoxin load, now holding   - transitioned back to Metoprolol 5mg IV q6h   - DC telemetry   - defer anticoagulation given fall risk and advanced dementia  - cardiology input appreciated

## 2025-02-14 NOTE — PROGRESS NOTE ADULT - PROBLEM SELECTOR PLAN 9
DVT ppx:  Lovenox    Dispo:  home w/ hospice, pending delivery of equipment     Code Status:  DNR/DNI    POC d/w daughter

## 2025-02-14 NOTE — PROGRESS NOTE ADULT - SUBJECTIVE AND OBJECTIVE BOX
Cardiovascular Disease Progress Note  DATE OF SERVICE: 02-14-25 @ 08:59    Overnight events: No acute events overnight.    The patient is in no distress.   Otherwise review of systems negative    Objective Findings:  T(C): 36.4 (02-14-25 @ 05:00), Max: 36.7 (02-13-25 @ 14:30)  HR: 82 (02-14-25 @ 05:00) (73 - 95)  BP: 160/88 (02-14-25 @ 05:00) (135/83 - 161/88)  RR: 18 (02-14-25 @ 05:00) (18 - 18)  SpO2: 100% (02-14-25 @ 05:00) (95% - 100%)  Wt(kg): --  Daily     Daily       Physical Exam:  Gen: NAD; Patient resting comfortably  HEENT:  Normocephalic/ atraumatic  CV: RRR, normal S1 + S2, no m/r/g  Lungs:  Normal respiratory effort; clear to auscultation bilaterally  Abd: soft, non-tender; bowel sounds present  Ext: No edema; warm and well perfused    Telemetry: Sinus    Laboratory Data:    No recent labs        Inpatient Medications:  MEDICATIONS  (STANDING):  atorvastatin 20 milliGRAM(s) Oral at bedtime  dextrose 5% + sodium chloride 0.45%. 1000 milliLiter(s) (75 mL/Hr) IV Continuous <Continuous>  enoxaparin Injectable 40 milliGRAM(s) SubCutaneous every 24 hours  finasteride 5 milliGRAM(s) Oral daily  losartan 50 milliGRAM(s) Oral daily  memantine 10 milliGRAM(s) Oral daily  metoprolol tartrate Injectable 5 milliGRAM(s) IV Push every 6 hours  tamsulosin 0.4 milliGRAM(s) Oral at bedtime  zinc oxide 20% Ointment 1 Application(s) Topical three times a day      Assessment: 89 year old man with HTN, hyperlipidemia, and advanced dementia presents with a-fib with RVR and COVID-19.     Plan of Care:    #A-fib with RVR-  Given debility, fall risk, and advanced dementia, the risks of anticoagulation outweigh the benefits.  Plan for hospice as per family wishes.  Discontinue telemetry monitoring.         Over 55 minutes spent on total encounter; 100% of the visit was spent counseling and/or coordinating care by the attending physician.      Mello Kinney MD [facility]  Cardiovascular Disease  [phone]

## 2025-02-14 NOTE — PROVIDER CONTACT NOTE (MEDICATION) - SITUATION
patient unable to tolerate PO flomax-unable to give due to safety concern/risk for aspiration. Pt unable to swallow thick liquids(as per order) patient spitting out liquids and/or pocketing.

## 2025-02-15 PROCEDURE — 99232 SBSQ HOSP IP/OBS MODERATE 35: CPT

## 2025-02-15 RX ADMIN — METOPROLOL SUCCINATE 5 MILLIGRAM(S): 50 TABLET, EXTENDED RELEASE ORAL at 17:30

## 2025-02-15 RX ADMIN — METOPROLOL SUCCINATE 5 MILLIGRAM(S): 50 TABLET, EXTENDED RELEASE ORAL at 22:19

## 2025-02-15 RX ADMIN — METOPROLOL SUCCINATE 5 MILLIGRAM(S): 50 TABLET, EXTENDED RELEASE ORAL at 12:15

## 2025-02-15 RX ADMIN — METOPROLOL SUCCINATE 5 MILLIGRAM(S): 50 TABLET, EXTENDED RELEASE ORAL at 05:57

## 2025-02-15 RX ADMIN — TOUCHLESS CARE ZINC OXIDE PROTECTANT 1 APPLICATION(S): 20; 25 SPRAY TOPICAL at 11:22

## 2025-02-15 RX ADMIN — METOPROLOL SUCCINATE 5 MILLIGRAM(S): 50 TABLET, EXTENDED RELEASE ORAL at 00:19

## 2025-02-15 RX ADMIN — TOUCHLESS CARE ZINC OXIDE PROTECTANT 1 APPLICATION(S): 20; 25 SPRAY TOPICAL at 22:19

## 2025-02-15 RX ADMIN — ENOXAPARIN SODIUM 40 MILLIGRAM(S): 100 INJECTION SUBCUTANEOUS at 12:15

## 2025-02-15 RX ADMIN — TOUCHLESS CARE ZINC OXIDE PROTECTANT 1 APPLICATION(S): 20; 25 SPRAY TOPICAL at 05:57

## 2025-02-15 NOTE — PROGRESS NOTE ADULT - SUBJECTIVE AND OBJECTIVE BOX
PROGRESS NOTE:     Patient is a 89y old  Male who presents with a chief complaint of AMS (14 Feb 2025 10:02)      SUBJECTIVE / OVERNIGHT EVENTS: no acute events.     ADDITIONAL REVIEW OF SYSTEMS:    MEDICATIONS  (STANDING):  dextrose 5% + sodium chloride 0.45%. 1000 milliLiter(s) (75 mL/Hr) IV Continuous <Continuous>  enoxaparin Injectable 40 milliGRAM(s) SubCutaneous every 24 hours  finasteride 5 milliGRAM(s) Oral daily  memantine 10 milliGRAM(s) Oral daily  metoprolol tartrate Injectable 5 milliGRAM(s) IV Push every 6 hours  tamsulosin 0.4 milliGRAM(s) Oral at bedtime  zinc oxide 20% Ointment 1 Application(s) Topical three times a day    MEDICATIONS  (PRN):      CAPILLARY BLOOD GLUCOSE        I&O's Summary    14 Feb 2025 07:01  -  15 Feb 2025 07:00  --------------------------------------------------------  IN: 300 mL / OUT: 0 mL / NET: 300 mL        PHYSICAL EXAM:  Vital Signs Last 24 Hrs  T(C): 36.9 (15 Feb 2025 04:00), Max: 36.9 (15 Feb 2025 04:00)  T(F): 98.4 (15 Feb 2025 04:00), Max: 98.4 (15 Feb 2025 04:00)  HR: 86 (15 Feb 2025 04:00) (61 - 100)  BP: 157/76 (15 Feb 2025 04:00) (117/78 - 169/88)  BP(mean): --  RR: 18 (15 Feb 2025 04:00) (17 - 19)  SpO2: 100% (15 Feb 2025 04:00) (97% - 100%)    Parameters below as of 15 Feb 2025 04:00  Patient On (Oxygen Delivery Method): room air      CONSTITUTIONAL: NAD  EYES: PERRL, No conjunctival or scleral injection, non-icteric  RESP: CTA b/l, no W/R/R  CV: Regular rate and rhythm, no M/R/G  GI: Soft, NT, ND, no rebound, no guarding  MSK: No clubbing, cyanosis, or edema   SKIN: No rashes or ulcers noted  NEURO: Awake and alert, not answering questions, CN II-XII grossly intact, moves extremities     LABS:                      RADIOLOGY & ADDITIONAL TESTS:  Results Reviewed:   Imaging Personally Reviewed:  Electrocardiogram Personally Reviewed:    COORDINATION OF CARE:  Care Discussed with Consultants/Other Providers [Y/N]:  Prior or Outpatient Records Reviewed [Y/N]:

## 2025-02-15 NOTE — PROGRESS NOTE ADULT - PROBLEM SELECTOR PLAN 9
DVT ppx:  Lovenox    Dispo:  home w/ hospice, pending delivery of equipment and home care set up     Code Status:  DNR/DNI

## 2025-02-15 NOTE — PROGRESS NOTE ADULT - SUBJECTIVE AND OBJECTIVE BOX
normal mood with appropriate affect Cardiovascular Disease Progress Note  DATE OF SERVICE: 02-15-25 @ 09:49    Overnight events: No acute events overnight.   The patient is in no distress.    Otherwise review of systems negative    Objective Findings:  T(C): 36.9 (02-15-25 @ 04:00), Max: 36.9 (02-15-25 @ 04:00)  HR: 86 (02-15-25 @ 04:00) (61 - 100)  BP: 157/76 (02-15-25 @ 04:00) (117/78 - 169/88)  RR: 18 (02-15-25 @ 04:00) (17 - 19)  SpO2: 100% (02-15-25 @ 04:00) (97% - 100%)  Wt(kg): --  Daily     Daily       Physical Exam:  Gen: NAD; Patient resting comfortably  HEENT: EOMI, Normocephalic/ atraumatic  CV: RRR, normal S1 + S2, no m/r/g  Lungs:  Normal respiratory effort; clear to auscultation bilaterally  Abd: soft, non-tender; bowel sounds present  Ext: No edema; warm and well perfused    Telemetry: Sinus    Laboratory Data:    No recent labs        Inpatient Medications:  MEDICATIONS  (STANDING):  dextrose 5% + sodium chloride 0.45%. 1000 milliLiter(s) (75 mL/Hr) IV Continuous <Continuous>  enoxaparin Injectable 40 milliGRAM(s) SubCutaneous every 24 hours  finasteride 5 milliGRAM(s) Oral daily  memantine 10 milliGRAM(s) Oral daily  metoprolol tartrate Injectable 5 milliGRAM(s) IV Push every 6 hours  tamsulosin 0.4 milliGRAM(s) Oral at bedtime  zinc oxide 20% Ointment 1 Application(s) Topical three times a day      Assessment: 89 year old man with HTN, hyperlipidemia, and advanced dementia presents with a-fib with RVR and COVID-19.     Plan of Care:    #A-fib with RVR-  Given debility, fall risk, and advanced dementia, the risks of anticoagulation outweigh the benefits.  Plan for hospice as per family wishes.  May discontinue telemetry monitoring.     #ACP (advance care planning)-  CPT 69107  Advanced care planning was discussed with the patient's family.  Advance care planning forms were discussed. Risks, benefits and alternatives of medical treatment and procedures were discussed in detail and all questions were answered.  Plan for hospice.  30 additional minutes spent addressing advance care plans.      Over 55 minutes spent on total encounter; 100% of the visit was spent counseling and/or coordinating care by the attending physician.      Mello Kinney MD [facility]  Cardiovascular Disease  [phone]

## 2025-02-16 PROCEDURE — 99232 SBSQ HOSP IP/OBS MODERATE 35: CPT

## 2025-02-16 RX ORDER — TAMSULOSIN HYDROCHLORIDE 0.4 MG/1
0.4 CAPSULE ORAL AT BEDTIME
Refills: 0 | Status: DISCONTINUED | OUTPATIENT
Start: 2025-02-16 | End: 2025-02-17

## 2025-02-16 RX ADMIN — METOPROLOL SUCCINATE 5 MILLIGRAM(S): 50 TABLET, EXTENDED RELEASE ORAL at 04:23

## 2025-02-16 RX ADMIN — METOPROLOL SUCCINATE 5 MILLIGRAM(S): 50 TABLET, EXTENDED RELEASE ORAL at 10:10

## 2025-02-16 RX ADMIN — TOUCHLESS CARE ZINC OXIDE PROTECTANT 1 APPLICATION(S): 20; 25 SPRAY TOPICAL at 04:23

## 2025-02-16 RX ADMIN — TOUCHLESS CARE ZINC OXIDE PROTECTANT 1 APPLICATION(S): 20; 25 SPRAY TOPICAL at 10:10

## 2025-02-16 RX ADMIN — TOUCHLESS CARE ZINC OXIDE PROTECTANT 1 APPLICATION(S): 20; 25 SPRAY TOPICAL at 21:45

## 2025-02-16 RX ADMIN — METOPROLOL SUCCINATE 5 MILLIGRAM(S): 50 TABLET, EXTENDED RELEASE ORAL at 16:03

## 2025-02-16 NOTE — PROGRESS NOTE ADULT - PROBLEM SELECTOR PLAN 2
New Afib w/ RVR in setting of underlying sepsis  - had RVR overnight d/t missing dose of PO Lopressor   - TTE showed nl LV function. grade 1 diastolic   - s/p digoxin load, now holding   - c/w Metoprolol 5mg IV q6h, can transition to PO on DC    - DC telemetry   - defer anticoagulation given fall risk and advanced dementia  - cardiology input appreciated

## 2025-02-16 NOTE — PROVIDER CONTACT NOTE (OTHER) - BACKGROUND
patient admit dx AMS, dementia, fall, found to be Covid+. Pt in and out of fevers since admission today.
patient admitted for AMS and weakness
patient admit dx AMS, dementia, fall, found to be Covid+
see flow sheet
see flowsheet
(Admit Diagnosis) Infection due to severe acute respiratory syndrome coronavirus 2 (SARS-CoV-2)  (PMH) HTN (hypertension)  (PMH) HLD (hyperlipidemia)  (PMH) Dementia
(Admit Diagnosis) Infection due to severe acute respiratory syndrome coronavirus 2 (SARS-CoV-2)  (PMH) HTN (hypertension)  (PMH) HLD (hyperlipidemia)  (PMH) Dementia
patient admit dx AMS, dementia, fall, found to be Covid+
patient admitted for covid  patient has hx A-Fib RVR
see flow sheet
(Admit Diagnosis) Infection due to severe acute respiratory syndrome coronavirus 2 (SARS-CoV-2)  (PMH) HTN (hypertension)  (PMH) HLD (hyperlipidemia)  (PMH) Dementia  (PMH) Depression
Patient admitted for AMS and weakness s/p COVID. PMH dementia, HTN, HLD, BPH, aflutter. Patient failed s&s eval, placed on pleasure feeds. Unable to tolerate PO meds this morning, pocketing meds
Patient has a PMH of depression, dementia, and hypertension.
(PMH) HTN (hypertension)  (PMH) HLD (hyperlipidemia)  (PMH) Dementia
see flowsheet
(Admit Diagnosis) Infection due to severe acute respiratory syndrome coronavirus 2 (SARS-CoV-2)  (PMH) HTN (hypertension)  (PMH) HLD (hyperlipidemia)  (PMH) Dementia
PMH HLD, dementia, prostate cancer
patient admit dx AMS, dementia, fall, found to be Covid+
patient admitted for COVID and AMS, history of htn, HLD, dementia.
see flowsheet

## 2025-02-16 NOTE — PROGRESS NOTE ADULT - PROBLEM SELECTOR PLAN 9
DVT ppx:  Lovenox    Dispo:  home w/ hospice, pending delivery of equipment and home care set up     Code Status:  DNR/DNI DVT ppx:  SCD's     Dispo:  home w/ hospice, pending delivery of equipment and home care set up     Code Status:  DNR/DNI DVT ppx:  SCD's     Dispo:  home w/ hospice in AM    Code Status:  DNR/DNI    POC d/w daughter

## 2025-02-16 NOTE — PROVIDER CONTACT NOTE (OTHER) - SITUATION
Patient afib RVR sustaining 110s-160s
Patient agitated, trying to hit staff and pulling at tele leads.
Patient tamsulosin not given as per ACP order. Patient not able to tolerate uncrushed PO medication.
noted with increase 's
Patient not tolerating PO meds
Patient's 
Pt HR in 175, sustaining in 130s
noted with increase 's
Pt /83 HR 77
Pt in rapid Afib up to 150s.
patient heart rate in130's on tele and on vitals machine
patient unable to tolerate PO medications. Patient pocketing oral food
Patient's 
Patient's HR went mian to 39
RN holding AM PO losartan due to patient unable to tolerate PO medication
patient HR 150s, sustaining
MEWS score 7, , /74, TEMP 98.5
Pt HR sustaining 150
Upon assessment, patient presenting with full body tremors, varying in severity.
noted with increase 's
patient lung sounds coarse, patient unable to cough up flem due to cognition
noted with continued  temp 102.2
patient HR jumped to 215 on tele monitoring with 10 beats of vtach. HR did not sustain at this rate, now jumping between 118-120's.
patient noted with elevated BP
noted with increase 's
patient febrile, tachy, hypertensive with vitals reading temp: 101.2, hr 118, bp 162/93. Pt tremors have worsened since initial assessment

## 2025-02-16 NOTE — PROVIDER CONTACT NOTE (OTHER) - DATE AND TIME:
09-Feb-2025 14:40
13-Feb-2025 21:00
31-Jan-2025 22:58
02-Feb-2025 03:03
05-Feb-2025 12:45
10-Feb-2025 15:05
30-Jan-2025 20:38
04-Feb-2025 11:06
05-Feb-2025 06:24
31-Jan-2025 09:33
02-Feb-2025 19:17
31-Jan-2025 10:34
04-Feb-2025 05:24
03-Feb-2025 00:08
09-Feb-2025 01:27
30-Jan-2025 22:50
31-Jan-2025 17:50
01-Feb-2025 02:00
04-Feb-2025 16:02
11-Feb-2025 04:10
16-Feb-2025 21:46
30-Jan-2025 22:35
31-Jan-2025 16:28
02-Feb-2025 05:54
30-Jan-2025 22:00
31-Jan-2025 08:00

## 2025-02-16 NOTE — PROVIDER CONTACT NOTE (OTHER) - REASON
HR went mian to 39
MEWS score 7
Patient not tolerating PO meds
Pt , sustaining in 130s
Pt in rapid afib up to 150s
patient HR jumped to 215 on tele monitoring with 10 beats of Vtach
patient febrile, tachy, hypertensive-tremors worsened
patient lung sounds coarse, patient unable to cough up flem due to cognition
Patient afib RVR sustaining 110s-160s
Patient tamsulosin not given as per ACP order
Pt HR sustaining 150
heart rate 150s
noted with continued  temp 102.2
noted with increase 's
tachycardic
Patient agitated, trying to hit staff and pulling off tele leads
elevated BP
holding PO losartan
noted with increase 's
patient unable to tolerate PO medications
Pt /83 HR 77
noted with increase 's
noted with increase 's
patient presents with full body tremors

## 2025-02-16 NOTE — PROVIDER CONTACT NOTE (OTHER) - RECOMMENDATIONS
iv medications given as per order
medications given as per order
Patient given standing dose of digoxin
cooling packs, meds, provider assessment
iv medications given as per order
notify provider
ACP made aware
Calming medication? Continue vsq4 to monitor temp
iv medications given as per order
medications given as per order
ACP made aware.
Haldol
Will continue to monitor HR on tele
ACP notified
duoneb respiratory treatment, bedside suction
ACP notified
Continue to monitor on tele and reschedule AM lopressor
continue to monitor provider made aware

## 2025-02-16 NOTE — PROVIDER CONTACT NOTE (OTHER) - NAME OF MD/NP/PA/DO NOTIFIED:
Ever rose
acp christianne rowe
JESSICA Pichardo
NIKKI Yao
Ever Patel-JESSICA
JESSICA Pichardo
Angy Workman
Angy Workman
Dav Lopez
Ever Patel-JESSICA
Ever Pichardo
JESSICA perea
SHAMEKA LARA
BRONWYN Trujillo
Dav Lopez
Ever Patel-JESSICA
JESSICA perea
JESSICA perea
Alpa Matos
Corrine Ramos
Ever Patel-JESSICA
Ever rose
JESSICA Patel
JESSICA perea
JESSICA perea
JESSICA Patel

## 2025-02-16 NOTE — PROGRESS NOTE ADULT - SUBJECTIVE AND OBJECTIVE BOX
PROGRESS NOTE:     Patient is a 89y old  Male who presents with a chief complaint of AMS (15 Feb 2025 09:48)      SUBJECTIVE / OVERNIGHT EVENTS: no acute events.     ADDITIONAL REVIEW OF SYSTEMS:    MEDICATIONS  (STANDING):  metoprolol tartrate Injectable 5 milliGRAM(s) IV Push every 6 hours  zinc oxide 20% Ointment 1 Application(s) Topical three times a day    MEDICATIONS  (PRN):      CAPILLARY BLOOD GLUCOSE        I&O's Summary      PHYSICAL EXAM:  Vital Signs Last 24 Hrs  T(C): 36.6 (16 Feb 2025 04:00), Max: 36.7 (15 Feb 2025 11:53)  T(F): 97.9 (16 Feb 2025 04:00), Max: 98.1 (15 Feb 2025 21:13)  HR: 72 (16 Feb 2025 04:00) (71 - 88)  BP: 148/83 (16 Feb 2025 04:00) (120/71 - 157/61)  BP(mean): --  RR: 18 (16 Feb 2025 04:00) (18 - 18)  SpO2: 100% (16 Feb 2025 04:00) (100% - 100%)    Parameters below as of 16 Feb 2025 04:00  Patient On (Oxygen Delivery Method): room air      CONSTITUTIONAL: NAD  EYES: PERRL, No conjunctival or scleral injection, non-icteric  RESP: CTA b/l, no W/R/R  CV: Regular rate and rhythm, no M/R/G  GI: Soft, NT, ND, no rebound, no guarding  MSK: No clubbing, cyanosis, or edema   SKIN: No rashes or ulcers noted  NEURO: Awake and alert, not answering questions, CN II-XII grossly intact, moves extremities     LABS:                      RADIOLOGY & ADDITIONAL TESTS:  Results Reviewed:   Imaging Personally Reviewed:  Electrocardiogram Personally Reviewed:    COORDINATION OF CARE:  Care Discussed with Consultants/Other Providers [Y/N]:  Prior or Outpatient Records Reviewed [Y/N]:

## 2025-02-16 NOTE — PROVIDER CONTACT NOTE (OTHER) - ACTION/TREATMENT ORDERED:
ACP aware, metoprolol IVP ordered
JESSCIA Curtis notified
Labetalol ordered IV push
iv medications given as per order  ice pack applied
suctions possibly chest pt
acp notified, 2.5mg lopressor IVP ordered and given per orders
continue to monitor on tele
iv medications given as per order
KRISTA Workman made aware.
iv medications given as per order
ACP aware, continue to monitor
ACP notified.
Reassess vitals and give metoprolol early
administer iv tyleonol as ordered
ACP Ever Pichardo notified.  AM lopressor rescheduled to 8AM while monitoring patient for continued and sustained bradycardia, JESSICA Curtis aware.
holding PO losartan due to aspiration risk  ACP made aware
provider aware patient unable to tolerate medications
ACP notified.
IV Tylenol
acp made aware. IVP metoprolol administered
ACP made aware.
JESSICA Curtis notified, 2.5mg haldol IM ordered
JESSICA Curtis notified, standing dose of digoxin given.
iv medications given as per order
iv medications given as per order

## 2025-02-17 ENCOUNTER — TRANSCRIPTION ENCOUNTER (OUTPATIENT)
Age: 89
End: 2025-02-17

## 2025-02-17 VITALS
DIASTOLIC BLOOD PRESSURE: 76 MMHG | HEART RATE: 82 BPM | RESPIRATION RATE: 17 BRPM | SYSTOLIC BLOOD PRESSURE: 137 MMHG | OXYGEN SATURATION: 99 % | TEMPERATURE: 98 F

## 2025-02-17 PROCEDURE — 99239 HOSP IP/OBS DSCHRG MGMT >30: CPT

## 2025-02-17 RX ORDER — FINASTERIDE 1 MG/1
1 TABLET, FILM COATED ORAL
Refills: 0 | DISCHARGE

## 2025-02-17 RX ORDER — METOPROLOL SUCCINATE 50 MG/1
1 TABLET, EXTENDED RELEASE ORAL
Qty: 60 | Refills: 0
Start: 2025-02-17 | End: 2025-03-18

## 2025-02-17 RX ORDER — ASPIRIN 325 MG
1 TABLET ORAL
Refills: 0 | DISCHARGE

## 2025-02-17 RX ORDER — TOUCHLESS CARE ZINC OXIDE PROTECTANT 20; 25 G/100G; G/100G
1 SPRAY TOPICAL
Qty: 0 | Refills: 0 | DISCHARGE
Start: 2025-02-17

## 2025-02-17 RX ADMIN — TOUCHLESS CARE ZINC OXIDE PROTECTANT 1 APPLICATION(S): 20; 25 SPRAY TOPICAL at 11:18

## 2025-02-17 RX ADMIN — TOUCHLESS CARE ZINC OXIDE PROTECTANT 1 APPLICATION(S): 20; 25 SPRAY TOPICAL at 05:43

## 2025-02-17 RX ADMIN — METOPROLOL SUCCINATE 5 MILLIGRAM(S): 50 TABLET, EXTENDED RELEASE ORAL at 11:19

## 2025-02-17 RX ADMIN — METOPROLOL SUCCINATE 5 MILLIGRAM(S): 50 TABLET, EXTENDED RELEASE ORAL at 05:43

## 2025-02-17 RX ADMIN — METOPROLOL SUCCINATE 5 MILLIGRAM(S): 50 TABLET, EXTENDED RELEASE ORAL at 00:07

## 2025-02-17 NOTE — DISCHARGE NOTE NURSING/CASE MANAGEMENT/SOCIAL WORK - FINANCIAL ASSISTANCE
[facility] provides services at a reduced cost to those who are determined to be eligible through [facility]’s financial assistance program. Information regarding [facility]’s financial assistance program can be found by going to [email or web address] or by calling [phone].

## 2025-02-17 NOTE — PROGRESS NOTE ADULT - PROBLEM SELECTOR PLAN 2
New Afib w/ RVR in setting of underlying sepsis  - HR mostly controlled   - TTE showed nl LV function. grade 1 diastolic   - s/p digoxin load, now holding   - on Metoprolol 5mg IV q6h, transition to PO on DC    - DC telemetry   - defer anticoagulation given fall risk and advanced dementia  - cardiology input appreciated

## 2025-02-17 NOTE — PROGRESS NOTE ADULT - PROBLEM SELECTOR PLAN 9
DVT ppx:  SCD's     Dispo:  home w/ hospice     Code Status:  DNR/DNI    DC home w/ hospice, DC time 36 minutes

## 2025-02-17 NOTE — PROGRESS NOTE ADULT - PROBLEM SELECTOR PLAN 6
- Holding home tamsulosin and finasteride
- resume home tamsulosin and finasteride
- Hold home tamsulosin 0.4mg PO qHS in setting of AMS  - Hold home finasteride 5mg PO daily in setting of AMS
- resume home tamsulosin and finasteride
- c/w tamsulosin as tolerated
- resumed home tamsulosin and finasteride
- resumed home tamsulosin and finasteride
- Holding home tamsulosin and finasteride
- resumed home tamsulosin and finasteride
- resumed home tamsulosin and finasteride
- Holding home tamsulosin and finasteride
- Holding home tamsulosin and finasteride
- c/w tamsulosin as tolerated
- resume home tamsulosin and finasteride
- Holding home tamsulosin and finasteride
- resumed home tamsulosin and finasteride
- resumed home tamsulosin and finasteride

## 2025-02-17 NOTE — DISCHARGE NOTE NURSING/CASE MANAGEMENT/SOCIAL WORK - PATIENT PORTAL LINK FT
You can access the FollowMyHealth Patient Portal offered by [facility] by registering at the following website: [email or web address]. By joining [facility]’s FollowMyHealth portal, you will also be able to view your health information using other applications (apps) compatible with our system.

## 2025-02-17 NOTE — PROGRESS NOTE ADULT - PROBLEM SELECTOR PROBLEM 3
Atrial fibrillation
Sepsis due to COVID-19
Sepsis due to COVID-19
Atrial fibrillation
Sepsis due to COVID-19
Atrial fibrillation
Sepsis due to COVID-19
Atrial fibrillation
Sepsis due to COVID-19
Atrial fibrillation
Atrial fibrillation

## 2025-02-17 NOTE — PROGRESS NOTE ADULT - SUBJECTIVE AND OBJECTIVE BOX
PROGRESS NOTE:     Patient is a 89y old  Male who presents with a chief complaint of AMS (16 Feb 2025 10:04)      SUBJECTIVE / OVERNIGHT EVENTS: no acute events.     ADDITIONAL REVIEW OF SYSTEMS:    MEDICATIONS  (STANDING):  metoprolol tartrate Injectable 5 milliGRAM(s) IV Push every 6 hours  tamsulosin 0.4 milliGRAM(s) Oral at bedtime  zinc oxide 20% Ointment 1 Application(s) Topical three times a day    MEDICATIONS  (PRN):      CAPILLARY BLOOD GLUCOSE        I&O's Summary      PHYSICAL EXAM:  Vital Signs Last 24 Hrs  T(C): 36.7 (17 Feb 2025 10:55), Max: 37.1 (16 Feb 2025 19:55)  T(F): 98.1 (17 Feb 2025 10:55), Max: 98.7 (16 Feb 2025 19:55)  HR: 82 (17 Feb 2025 10:55) (78 - 100)  BP: 137/76 (17 Feb 2025 10:55) (108/82 - 151/61)  BP(mean): --  RR: 17 (17 Feb 2025 10:55) (17 - 18)  SpO2: 99% (17 Feb 2025 10:55) (96% - 99%)    Parameters below as of 17 Feb 2025 10:55  Patient On (Oxygen Delivery Method): room air      CONSTITUTIONAL: NAD  EYES: PERRL, No conjunctival or scleral injection, non-icteric  RESP: CTA b/l, no W/R/R  CV: Regular rate and rhythm, no M/R/G  GI: Soft, NT, ND, no rebound, no guarding  MSK: No clubbing, cyanosis, or edema   SKIN: No rashes or ulcers noted  NEURO: Awake and alert, not answering questions, CN II-XII grossly intact, moves extremities     LABS:                      RADIOLOGY & ADDITIONAL TESTS:  Results Reviewed:   Imaging Personally Reviewed:  Electrocardiogram Personally Reviewed:    COORDINATION OF CARE:  Care Discussed with Consultants/Other Providers [Y/N]:  Prior or Outpatient Records Reviewed [Y/N]:

## 2025-02-17 NOTE — PROGRESS NOTE ADULT - PROBLEM SELECTOR PROBLEM 7
HLD (hyperlipidemia)

## 2025-02-17 NOTE — PROGRESS NOTE ADULT - PROBLEM SELECTOR PLAN 1
Likely d/t COVID infection w/ hypoactive delirium and underlying dementia   - management of COVID as below   - s/p IVF   - remains confused, not taking PO  - plan for hospice
Viral sepsis POA d/t COVID infection  - c/w Remdesivir   - CXR w/ clear lungs   - blood cultures NGTD   - procalcitonin 0.19   - dc Zosyn
Viral sepsis POA d/t COVID infection, sepsis resolved   - s/p Remdesivir   - CXR w/ clear lungs   - blood cultures NGTD   - procalcitonin 0.19   - dc Zosyn
Viral sepsis POA d/t COVID infection  - c/w Remdesivir   - CXR w/ clear lungs   - blood cultures NGTD   - procalcitonin 0.19   - dc Zosyn
Viral sepsis POA d/t COVID infection, sepsis resolved   - s/p Remdesivir   - CXR w/ clear lungs   - blood cultures NGTD   - procalcitonin 0.19   - dc Zosyn
Viral sepsis POA d/t COVID infection  - c/w Remdesivir   - CXR w/ clear lungs   - blood cultures NGTD   - procalcitonin 0.19   - dc Zosyn  - gentle hydration  - mildly leukocytosis today. > repeat CXR.
Viral sepsis POA d/t COVID infection  - c/w Remdesivir   - CXR w/ clear lungs   - blood cultures NGTD   - procalcitonin 0.19   - dc Zosyn  - gentle hydration  - mildly leukocytosis today. > repeat CXR 2/6 w/o new opacity.   - ctm clinically.  - resolved. but remain encephalopathic.
Viral sepsis POA d/t COVID infection  - c/w Remdesivir 200mg   - CXR w/ clear lungs   - On Zosyn, can d/c if blood cultures neg
Likely d/t COVID infection w/ hypoactive delirium and underlying dementia   - management of COVID as below   - s/p IVF   - remains confused, not taking PO  - plan for hospice
Likely d/t COVID infection w/ hypoactive delirium and underlying dementia   - management of COVID as below   - s/p IVF   - remains confused, not taking PO  - plan for hospice
Viral sepsis POA d/t COVID infection  - c/w Remdesivir   - CXR w/ clear lungs   - blood cultures NGTD   - procalcitonin 0.19   - dc Zosyn  - gentle hydration
Viral sepsis POA d/t COVID infection  - c/w Remdesivir   - CXR w/ clear lungs   - blood cultures NGTD   - procalcitonin 0.19   - dc Zosyn
Viral sepsis POA d/t COVID infection, sepsis resolved   - s/p Remdesivir   - CXR w/ clear lungs   - blood cultures NGTD   - procalcitonin 0.19   - dc Zosyn
Viral sepsis POA d/t COVID infection  - c/w Remdesivir   - CXR w/ clear lungs   - blood cultures NGTD   - procalcitonin 0.19   - On Zosyn, can d/c if blood cultures remain negative
Viral sepsis POA d/t COVID infection  - c/w Remdesivir   - CXR w/ clear lungs   - blood cultures NGTD   - procalcitonin 0.19   - dc Zosyn  - gentle hydration
Viral sepsis POA d/t COVID infection  - c/w Remdesivir   - CXR w/ clear lungs   - blood cultures NGTD   - procalcitonin 0.19   - dc Zosyn  - gentle hydration  - mildly leukocytosis today. > repeat CXR 2/6 w/o new opacity.   - ctm clinically.
Likely d/t COVID infection w/ hypoactive delirium and underlying dementia   - management of COVID as below   - s/p IVF   - remains confused, not taking much PO  - plan for hospice
Likely d/t COVID infection w/ hypoactive delirium and underlying dementia   - management of COVID as above  - s/p IVF   - remains confused, not taking PO
Viral sepsis POA d/t COVID infection  - c/w Remdesivir   - CXR w/ clear lungs   - blood cultures NGTD   - procalcitonin 0.19   - dc Zosyn  - gentle hydration  - mildly leukocytosis today. > repeat CXR 2/6 w/o new opacity.   - ctm clinically.  - resolved. but remain intermittently encephalopathic.

## 2025-02-17 NOTE — PROGRESS NOTE ADULT - PROBLEM SELECTOR PROBLEM 2
Atrial fibrillation
Toxic metabolic encephalopathy
Atrial fibrillation
Atrial fibrillation
Toxic metabolic encephalopathy
Atrial fibrillation
Toxic metabolic encephalopathy
Atrial fibrillation
Toxic metabolic encephalopathy
Toxic metabolic encephalopathy

## 2025-02-17 NOTE — PROGRESS NOTE ADULT - PROBLEM SELECTOR PLAN 7
- DC statin (not taking)
- Hold home simvastatin 40mg PO daily
- resume statin
- resumed statin
- DC statin (not taking)
- DC statin (not taking)
- resume statin
- resume home simvastatin 40mg PO daily
- resume home simvastatin 40mg PO daily
- Hold home simvastatin 40mg PO daily
- resume statin
- Hold home simvastatin 40mg PO daily
- Hold home simvastatin 40mg PO daily in setting of AMS  - Hold home aspirin 81mg PO daily in setting of AMS (unclear why he takes aspirin)
- resume home simvastatin 40mg PO daily
- DC statin (not taking)
- Hold home simvastatin 40mg PO daily
- Hold home simvastatin 40mg PO daily

## 2025-02-17 NOTE — PROGRESS NOTE ADULT - NUTRITIONAL ASSESSMENT
This patient has been assessed with a concern for Malnutrition and has been determined to have a diagnosis/diagnoses of Severe protein-calorie malnutrition.    This patient is being managed with:   Diet Pureed-  Moderately Thick Liquids (MODTHICKLIQS)  Entered: Feb 2 2025 12:15PM  

## 2025-02-17 NOTE — PROGRESS NOTE ADULT - PROBLEM SELECTOR PROBLEM 1
Sepsis due to COVID-19
Toxic metabolic encephalopathy
Sepsis due to COVID-19
Sepsis due to COVID-19
Toxic metabolic encephalopathy
Sepsis due to COVID-19
Sepsis due to COVID-19
Toxic metabolic encephalopathy
Sepsis due to COVID-19
Toxic metabolic encephalopathy
Sepsis due to COVID-19
Toxic metabolic encephalopathy
Sepsis due to COVID-19

## 2025-02-17 NOTE — PROGRESS NOTE ADULT - REASON FOR ADMISSION
AMS

## 2025-02-17 NOTE — PROGRESS NOTE ADULT - ASSESSMENT
Mr Eric Mckenna is an 89 year old male with a PMHx of HTN, HLD, dementia, and prostate cancer BIBEMS for AMS and weakness, found to be COVID 19 positive, also w/ new onset Afib w/ RVR. mental status improving. PO intake wax and wanes. family opt for hospice care. 
Mr Eric Mckenna is an 89 year old male with a PMHx of HTN, HLD, dementia, and prostate cancer BIBEMS for AMS and weakness, found to be COVID 19 positive, also w/ new onset Afib w/ RVR. remain encephalopathic and unable to tolerate PO. 
Mr Eric Mckenna is an 89 year old male with a PMHx of HTN, HLD, dementia, and prostate cancer BIBEMS for AMS and weakness, found to be COVID 19 positive, also w/ new onset Afib w/ RVR. mental status improving. PO intake improving with family. 
Mr Eric Mckenna is an 89 year old male with a PMHx of HTN, HLD, dementia, and prostate cancer BIBEMS for AMS and weakness, found to be COVID 19 positive, also w/ new onset Afib w/ RVR. 
Mr Eric Mckenna is an 89 year old male with a PMHx of HTN, HLD, dementia, and prostate cancer BIBEMS for AMS and weakness, found to be COVID 19 positive, also w/ new onset Afib w/ RVR. mental status improving. PO intake improving with family. 
Mr Eric Mckenna is an 89 year old male with a PMHx of HTN, HLD, dementia, and prostate cancer BIBEMS for AMS and weakness, found to be COVID 19 positive, also w/ new onset Afib w/ RVR. mental status improving. PO intake improving with family. 
Mr Eric Mckenna is an 89 year old male with a PMHx of HTN, HLD, dementia, and prostate cancer BIBEMS for AMS and weakness, found to be COVID 19 positive, also w/ new onset Afib w/ RVR. 
Mr Eric Mckenna is an 89 year old male with a PMHx of HTN, HLD, dementia, and prostate cancer BIBEMS for AMS and weakness, found to be COVID 19 positive, also w/ new onset Afib w/ RVR. 
Mr Eric Mckenna is an 89 year old male with a PMHx of HTN, HLD, dementia, and prostate cancer BIBEMS for AMS and weakness, found to be COVID 19 positive, also w/ new onset Afib w/ RVR. mental status improving. PO intake wax and wanes. family opt for hospice care. 
Mr Eric Mckenna is an 89 year old male with a PMHx of HTN, HLD, dementia, and prostate cancer BIBEMS for AMS and weakness, found to be COVID 19 positive
Mr Eric Mckenna is an 89 year old male with a PMHx of HTN, HLD, dementia, and prostate cancer BIBEMS for AMS and weakness, found to be COVID 19 positive, also w/ new onset Afib w/ RVR. mental status improving. PO intake wax and wanes. family opt for hospice care. 
Mr Eric Mckenna is an 89 year old male with a PMHx of HTN, HLD, dementia, and prostate cancer BIBEMS for AMS and weakness, found to be COVID 19 positive, also w/ new onset Afib w/ RVR. 
Mr Eric Mckenna is an 89 year old male with a PMHx of HTN, HLD, dementia, and prostate cancer BIBEMS for AMS and weakness, found to be COVID 19 positive, also w/ new onset Afib w/ RVR. mental status improving. PO intake wax and wanes. family opt for hospice care. 
Mr Eric Mckenna is an 89 year old male with a PMHx of HTN, HLD, dementia, and prostate cancer BIBEMS for AMS and weakness, found to be COVID 19 positive, also w/ new onset Afib w/ RVR. mental status improving. PO intake wax and wanes. family opt for hospice care. 
Mr Eric Mckenna is an 89 year old male with a PMHx of HTN, HLD, dementia, and prostate cancer BIBEMS for AMS and weakness, found to be COVID 19 positive, also w/ new onset Afib w/ RVR. mental status improving. PO intake improving with family. 
Mr Eric Mckenna is an 89 year old male with a PMHx of HTN, HLD, dementia, and prostate cancer BIBEMS for AMS and weakness, found to be COVID 19 positive, also w/ new onset Afib w/ RVR. remain encephalopathic and unable to tolerate PO. 
Mr Eric Mckenna is an 89 year old male with a PMHx of HTN, HLD, dementia, and prostate cancer BIBEMS for AMS and weakness, found to be COVID 19 positive, also w/ new onset Afib w/ RVR. mental status improving. PO intake improving with family. 
Mr Eric Mckenna is an 89 year old male with a PMHx of HTN, HLD, dementia, and prostate cancer BIBEMS for AMS and weakness, found to be COVID 19 positive, also w/ new onset Afib w/ RVR. mental status improving. PO intake improving with family. 
Mr Eric Mckenna is an 89 year old male with a PMHx of HTN, HLD, dementia, and prostate cancer BIBEMS for AMS and weakness, found to be COVID 19 positive, also w/ new onset Afib w/ RVR. remain encephalopathic and unable to tolerate PO.

## 2025-02-17 NOTE — DISCHARGE NOTE NURSING/CASE MANAGEMENT/SOCIAL WORK - NSDCPEFALRISK_GEN_ALL_CORE
For information on Fall & Injury Prevention, visit: [email or web address] OR  [email or web address] OR  https://www.cdc.gov/steadi/patient.html

## 2025-02-17 NOTE — PROGRESS NOTE ADULT - PROVIDER SPECIALTY LIST ADULT
Cardiology
Hospitalist
Cardiology
Hospitalist

## 2025-02-17 NOTE — PROGRESS NOTE ADULT - PROBLEM SELECTOR PLAN 4
- Hold home memantine and buproprion
- holding buproprion  - restarted home memantine   - add seroquel 12.5mg qhs to help with restless at night.
- Hold home memantine and buproprion
- Hold home memantine and buproprion
- discontinued buproprion and memantine
- Hold home memantine and buproprion
- holding buproprion  - restarted home memantine
- Hold home memantine and buproprion
- Hold home memantine and buproprion
- discontinued buproprion and memantine
- holding buproprion  - restarted home memantine
- holding buproprion  - restarted home memantine
- Hold home memantine and buproprion
- holding buproprion  - restarted home memantine
- holding buproprion  - restarted home memantine   - add seroquel 12.5mg qhs to help with restless at night.

## 2025-02-17 NOTE — PROGRESS NOTE ADULT - PROBLEM SELECTOR PLAN 5
- Hold home losartan 50mg PO daily  - monitor BP
- c/w losartan 50mg qd and metoprolol   - monitor BP
- Hold home losartan 50mg PO daily  - monitor BP
- c/w losartan 50mg qd and metoprolol   - monitor BP
- Hold home losartan 50mg PO daily  - monitor BP
- DC losartan (not taking)  - c/w Metoprolol   - monitor BP
- DC losartan (not taking)  - c/w Metoprolol   - monitor BP
- Hold home losartan 50mg PO daily  - monitor BP
- DC losartan (not taking)  - c/w Metoprolol   - monitor BP
- c/w losartan 50mg qd and metoprolol   - monitor BP
- c/w losartan 50mg qd and metoprolol   - monitor BP
- DC losartan (not taking)  - c/w Metoprolol   - monitor BP
- restarted on losartan  - increase to 50mg qd.
- Hold home losartan 50mg PO daily  - monitor BP
- c/w losartan 50mg qd.
- Hold home losartan 50mg PO daily  - monitor BP

## 2025-02-17 NOTE — PROGRESS NOTE ADULT - PROBLEM SELECTOR PROBLEM 6
BPH (benign prostatic hyperplasia)